# Patient Record
Sex: MALE | Race: BLACK OR AFRICAN AMERICAN | Employment: UNEMPLOYED | URBAN - METROPOLITAN AREA
[De-identification: names, ages, dates, MRNs, and addresses within clinical notes are randomized per-mention and may not be internally consistent; named-entity substitution may affect disease eponyms.]

---

## 2020-07-07 ENCOUNTER — HOSPITAL ENCOUNTER (OUTPATIENT)
Dept: GENERAL RADIOLOGY | Age: 55
Discharge: HOME | End: 2020-07-07
Attending: INTERNAL MEDICINE
Payer: COMMERCIAL

## 2020-07-07 ENCOUNTER — HOSPITAL ENCOUNTER (OUTPATIENT)
Dept: LAB | Age: 55
Discharge: HOME | End: 2020-07-07
Payer: COMMERCIAL

## 2020-07-07 ENCOUNTER — OFFICE VISIT (OUTPATIENT)
Dept: FAMILY MEDICINE CLINIC | Age: 55
End: 2020-07-07

## 2020-07-07 VITALS
RESPIRATION RATE: 16 BRPM | HEART RATE: 62 BPM | HEIGHT: 68 IN | DIASTOLIC BLOOD PRESSURE: 87 MMHG | WEIGHT: 139 LBS | BODY MASS INDEX: 21.07 KG/M2 | SYSTOLIC BLOOD PRESSURE: 153 MMHG | OXYGEN SATURATION: 99 % | TEMPERATURE: 97.8 F

## 2020-07-07 DIAGNOSIS — Z00.00 BLOOD TESTS FOR ROUTINE GENERAL PHYSICAL EXAMINATION: ICD-10-CM

## 2020-07-07 DIAGNOSIS — G89.29 HIP PAIN, CHRONIC, LEFT: Primary | ICD-10-CM

## 2020-07-07 DIAGNOSIS — Z11.4 SCREENING FOR HIV (HUMAN IMMUNODEFICIENCY VIRUS): ICD-10-CM

## 2020-07-07 DIAGNOSIS — G89.29 HIP PAIN, CHRONIC, LEFT: ICD-10-CM

## 2020-07-07 DIAGNOSIS — Z11.59 NEED FOR HEPATITIS C SCREENING TEST: ICD-10-CM

## 2020-07-07 DIAGNOSIS — M25.552 HIP PAIN, CHRONIC, LEFT: ICD-10-CM

## 2020-07-07 DIAGNOSIS — M25.552 HIP PAIN, CHRONIC, LEFT: Primary | ICD-10-CM

## 2020-07-07 DIAGNOSIS — M16.9 OSTEOARTHROSIS, HIP: Primary | ICD-10-CM

## 2020-07-07 DIAGNOSIS — M16.12 OSTEOARTHRITIS OF LEFT HIP, UNSPECIFIED OSTEOARTHRITIS TYPE: ICD-10-CM

## 2020-07-07 LAB
ALBUMIN SERPL-MCNC: 4 G/DL (ref 3.4–5)
ALBUMIN/GLOB SERPL: 1.2 {RATIO} (ref 0.8–1.7)
ALP SERPL-CCNC: 75 U/L (ref 45–117)
ALT SERPL-CCNC: 25 U/L (ref 16–61)
ANION GAP SERPL CALC-SCNC: 4 MMOL/L (ref 3–18)
APPEARANCE UR: CLEAR
AST SERPL-CCNC: 21 U/L (ref 10–38)
BACTERIA URNS QL MICRO: ABNORMAL /HPF
BASOPHILS # BLD: 0 K/UL (ref 0–0.1)
BASOPHILS NFR BLD: 1 % (ref 0–2)
BILIRUB SERPL-MCNC: 0.6 MG/DL (ref 0.2–1)
BILIRUB UR QL: NEGATIVE
BUN SERPL-MCNC: 14 MG/DL (ref 7–18)
BUN/CREAT SERPL: 9 (ref 12–20)
CALCIUM SERPL-MCNC: 9.3 MG/DL (ref 8.5–10.1)
CHLORIDE SERPL-SCNC: 109 MMOL/L (ref 100–111)
CHOLEST SERPL-MCNC: 184 MG/DL
CO2 SERPL-SCNC: 27 MMOL/L (ref 21–32)
COLOR UR: YELLOW
CREAT SERPL-MCNC: 1.54 MG/DL (ref 0.6–1.3)
DIFFERENTIAL METHOD BLD: ABNORMAL
EOSINOPHIL # BLD: 0.4 K/UL (ref 0–0.4)
EOSINOPHIL NFR BLD: 7 % (ref 0–5)
EPITH CASTS URNS QL MICRO: ABNORMAL /LPF (ref 0–5)
ERYTHROCYTE [DISTWIDTH] IN BLOOD BY AUTOMATED COUNT: 13.2 % (ref 11.6–14.5)
EST. AVERAGE GLUCOSE BLD GHB EST-MCNC: 100 MG/DL
GLOBULIN SER CALC-MCNC: 3.4 G/DL (ref 2–4)
GLUCOSE SERPL-MCNC: 90 MG/DL (ref 74–99)
GLUCOSE UR STRIP.AUTO-MCNC: NEGATIVE MG/DL
HBA1C MFR BLD: 5.1 % (ref 4.2–5.6)
HCT VFR BLD AUTO: 41 % (ref 36–48)
HDLC SERPL-MCNC: 111 MG/DL (ref 40–60)
HDLC SERPL: 1.7 {RATIO} (ref 0–5)
HGB BLD-MCNC: 14.3 G/DL (ref 13–16)
HGB UR QL STRIP: NEGATIVE
KETONES UR QL STRIP.AUTO: NEGATIVE MG/DL
LDLC SERPL CALC-MCNC: 51.4 MG/DL (ref 0–100)
LEUKOCYTE ESTERASE UR QL STRIP.AUTO: ABNORMAL
LIPID PROFILE,FLP: ABNORMAL
LYMPHOCYTES # BLD: 1.8 K/UL (ref 0.9–3.6)
LYMPHOCYTES NFR BLD: 28 % (ref 21–52)
MCH RBC QN AUTO: 31.8 PG (ref 24–34)
MCHC RBC AUTO-ENTMCNC: 34.9 G/DL (ref 31–37)
MCV RBC AUTO: 91.3 FL (ref 74–97)
MONOCYTES # BLD: 0.7 K/UL (ref 0.05–1.2)
MONOCYTES NFR BLD: 10 % (ref 3–10)
NEUTS SEG # BLD: 3.5 K/UL (ref 1.8–8)
NEUTS SEG NFR BLD: 54 % (ref 40–73)
NITRITE UR QL STRIP.AUTO: NEGATIVE
PH UR STRIP: 5 [PH] (ref 5–8)
PLATELET # BLD AUTO: 255 K/UL (ref 135–420)
PMV BLD AUTO: 10 FL (ref 9.2–11.8)
POTASSIUM SERPL-SCNC: 4.9 MMOL/L (ref 3.5–5.5)
PROT SERPL-MCNC: 7.4 G/DL (ref 6.4–8.2)
PROT UR STRIP-MCNC: NEGATIVE MG/DL
RBC # BLD AUTO: 4.49 M/UL (ref 4.7–5.5)
SODIUM SERPL-SCNC: 140 MMOL/L (ref 136–145)
SP GR UR REFRACTOMETRY: 1.02 (ref 1–1.03)
TRIGL SERPL-MCNC: 108 MG/DL (ref ?–150)
UROBILINOGEN UR QL STRIP.AUTO: 0.2 EU/DL (ref 0.2–1)
VLDLC SERPL CALC-MCNC: 21.6 MG/DL
WBC # BLD AUTO: 6.4 K/UL (ref 4.6–13.2)
WBC URNS QL MICRO: ABNORMAL /HPF (ref 0–5)

## 2020-07-07 PROCEDURE — 81001 URINALYSIS AUTO W/SCOPE: CPT

## 2020-07-07 PROCEDURE — 80061 LIPID PANEL: CPT

## 2020-07-07 PROCEDURE — 36415 COLL VENOUS BLD VENIPUNCTURE: CPT

## 2020-07-07 PROCEDURE — 80053 COMPREHEN METABOLIC PANEL: CPT

## 2020-07-07 PROCEDURE — 73502 X-RAY EXAM HIP UNI 2-3 VIEWS: CPT

## 2020-07-07 PROCEDURE — 83036 HEMOGLOBIN GLYCOSYLATED A1C: CPT

## 2020-07-07 PROCEDURE — 86803 HEPATITIS C AB TEST: CPT

## 2020-07-07 PROCEDURE — 85025 COMPLETE CBC W/AUTO DIFF WBC: CPT

## 2020-07-07 PROCEDURE — 87389 HIV-1 AG W/HIV-1&-2 AB AG IA: CPT
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
## 2020-07-07 NOTE — PROGRESS NOTES
XR of the hip showed severe osteoarthritis. I have discussed with patient the possibility of ortho referral during his appointment. Referral entered today. Please advise patient.

## 2020-07-07 NOTE — PROGRESS NOTES
Katya Klein presents today for Est Care, left hip pain --   - Is someone accompanying this pt?  Mother, Ghassan Sarai  - Is the patient using any durable medical equipment during office visit? no

## 2020-07-08 LAB
HIV 1+2 AB+HIV1 P24 AG SERPL QL IA: NONREACTIVE
HIV12 RESULT COMMENT, HHIVC: NORMAL

## 2020-07-09 LAB
HCV AB S/CO SERPL IA: 0.1 S/CO RATIO (ref 0–0.9)
HCV AB SERPL QL IA: NORMAL

## 2020-07-20 ENCOUNTER — HOSPITAL ENCOUNTER (OUTPATIENT)
Dept: LAB | Age: 55
Discharge: HOME | End: 2020-07-20
Payer: COMMERCIAL

## 2020-07-20 ENCOUNTER — OFFICE VISIT (OUTPATIENT)
Dept: FAMILY MEDICINE CLINIC | Age: 55
End: 2020-07-20

## 2020-07-20 VITALS
TEMPERATURE: 98.3 F | RESPIRATION RATE: 18 BRPM | SYSTOLIC BLOOD PRESSURE: 139 MMHG | HEIGHT: 68 IN | DIASTOLIC BLOOD PRESSURE: 72 MMHG | OXYGEN SATURATION: 98 % | HEART RATE: 98 BPM | WEIGHT: 145 LBS | BODY MASS INDEX: 21.98 KG/M2

## 2020-07-20 DIAGNOSIS — Z12.11 SCREENING FOR MALIGNANT NEOPLASM OF COLON: ICD-10-CM

## 2020-07-20 DIAGNOSIS — K21.9 GASTROESOPHAGEAL REFLUX DISEASE, ESOPHAGITIS PRESENCE NOT SPECIFIED: ICD-10-CM

## 2020-07-20 DIAGNOSIS — Z00.00 ROUTINE GENERAL MEDICAL EXAMINATION AT A HEALTH CARE FACILITY: Primary | ICD-10-CM

## 2020-07-20 DIAGNOSIS — R94.4 DECREASED GFR: ICD-10-CM

## 2020-07-20 DIAGNOSIS — Z23 ENCOUNTER FOR IMMUNIZATION: ICD-10-CM

## 2020-07-20 DIAGNOSIS — M16.52 POST-TRAUMATIC OSTEOARTHRITIS OF LEFT HIP: ICD-10-CM

## 2020-07-20 LAB
ANION GAP SERPL CALC-SCNC: 3 MMOL/L (ref 3–18)
BUN SERPL-MCNC: 16 MG/DL (ref 7–18)
BUN/CREAT SERPL: 11 (ref 12–20)
CALCIUM SERPL-MCNC: 8.8 MG/DL (ref 8.5–10.1)
CHLORIDE SERPL-SCNC: 106 MMOL/L (ref 100–111)
CO2 SERPL-SCNC: 30 MMOL/L (ref 21–32)
CREAT SERPL-MCNC: 1.51 MG/DL (ref 0.6–1.3)
GLUCOSE SERPL-MCNC: 86 MG/DL (ref 74–99)
POTASSIUM SERPL-SCNC: 5 MMOL/L (ref 3.5–5.5)
SODIUM SERPL-SCNC: 139 MMOL/L (ref 136–145)

## 2020-07-20 PROCEDURE — 80048 BASIC METABOLIC PNL TOTAL CA: CPT

## 2020-07-20 RX ORDER — OMEPRAZOLE 40 MG/1
40 CAPSULE, DELAYED RELEASE ORAL DAILY
Qty: 30 CAP | Refills: 0 | Status: SHIPPED | OUTPATIENT
Start: 2020-07-20 | End: 2020-09-28 | Stop reason: SDUPTHER

## 2020-07-20 NOTE — PROGRESS NOTES
Katya Klein presents today for results, cpe  - Is someone accompanying this pt? mother  - Is the patient using any durable medical equipment during office visit? no    Coordination of Care:  1. Have you been to the ER, urgent care clinic since your last visit? Hospitalized since your last visit? no    2. Have you seen or consulted any other health care providers outside of the [address] [place] [address] since your last visit? Include any pap smears or colon screening. No      Obtained signed consent form from patient. Per verbal order from Dr. Tabby Claudio- injection of shingrix #1 and Pneumovax 23 administered. Verified by this nurse and ANUP Hood that this is the correct immunization/injection. Patient instructed to remain in clinic for 15 minutes and to report any adverse reactions immediately. Most recent VIS given. No adverse reactions noted after 15 minutes of observation. Depression Screening:  3 most recent PHQ Screens 7/7/2020   Little interest or pleasure in doing things Several days   Feeling down, depressed, irritable, or hopeless Not at all   Total Score PHQ 2 1       Learning Assessment:  Learning Assessment 7/7/2020   PRIMARY LEARNER Patient   HIGHEST LEVEL OF EDUCATION - PRIMARY LEARNER  SOME COLLEGE   BARRIERS PRIMARY LEARNER NONE   CO-LEARNER CAREGIVER No   PRIMARY LANGUAGE ENGLISH   LEARNER PREFERENCE PRIMARY LISTENING   ANSWERED BY patient   RELATIONSHIP SELF       Abuse Screening:  Abuse Screening Questionnaire 7/7/2020   Do you ever feel afraid of your partner? N   Are you in a relationship with someone who physically or mentally threatens you? N   Is it safe for you to go home? Y       Fall Risk  Fall Risk Assessment, last 12 mths 7/7/2020   Able to walk? Yes   Fall in past 12 months? No       Health Maintenance reviewed and discussed and ordered per Provider.   Health Maintenance Due   Topic Date Due    Pneumococcal 0-64 years (1 of 1 - PPSV23) 01/06/1971    DTaP/Tdap/Td series (1 - Tdap) 01/06/1986    Shingrix Vaccine Age 50> (1 of 2) 01/06/2015    FOBT Q1Y Age 50-75  01/06/2015
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
## 2020-08-13 ENCOUNTER — OFFICE VISIT (OUTPATIENT)
Dept: ORTHOPEDIC SURGERY | Facility: CLINIC | Age: 55
End: 2020-08-13

## 2020-08-13 VITALS
HEIGHT: 68 IN | DIASTOLIC BLOOD PRESSURE: 83 MMHG | TEMPERATURE: 97.5 F | BODY MASS INDEX: 22.13 KG/M2 | OXYGEN SATURATION: 98 % | WEIGHT: 146 LBS | HEART RATE: 80 BPM | SYSTOLIC BLOOD PRESSURE: 146 MMHG | RESPIRATION RATE: 18 BRPM

## 2020-08-13 DIAGNOSIS — M87.052 AVASCULAR NECROSIS OF BONE OF LEFT HIP (HCC): ICD-10-CM

## 2020-08-13 DIAGNOSIS — M16.52 POST-TRAUMATIC OSTEOARTHRITIS OF LEFT HIP: Primary | ICD-10-CM

## 2020-08-13 DIAGNOSIS — G89.29 CHRONIC LEFT HIP PAIN: ICD-10-CM

## 2020-08-13 DIAGNOSIS — M25.552 CHRONIC LEFT HIP PAIN: ICD-10-CM

## 2020-08-13 RX ORDER — NAPROXEN SODIUM 220 MG
220 TABLET ORAL 2 TIMES DAILY WITH MEALS
COMMUNITY
End: 2020-09-28

## 2020-08-13 RX ORDER — IBUPROFEN 200 MG
TABLET ORAL
COMMUNITY
End: 2020-10-05

## 2020-08-13 RX ORDER — ACETAMINOPHEN 325 MG/1
TABLET ORAL
COMMUNITY
End: 2020-09-28 | Stop reason: DRUGHIGH

## 2020-08-24 DIAGNOSIS — M16.52 POST-TRAUMATIC OSTEOARTHRITIS OF LEFT HIP: ICD-10-CM

## 2020-08-24 DIAGNOSIS — Z01.811 PRE-OPERATIVE RESPIRATORY EXAMINATION: ICD-10-CM

## 2020-08-24 DIAGNOSIS — M87.052 AVASCULAR NECROSIS OF BONE OF LEFT HIP (HCC): Primary | ICD-10-CM

## 2020-08-24 DIAGNOSIS — Z01.810 PRE-OPERATIVE CARDIOVASCULAR EXAMINATION: ICD-10-CM

## 2020-08-24 DIAGNOSIS — Z01.812 PRE-OPERATIVE LABORATORY EXAMINATION: ICD-10-CM

## 2020-08-25 DIAGNOSIS — Z01.812 PRE-OPERATIVE LABORATORY EXAMINATION: ICD-10-CM

## 2020-08-25 DIAGNOSIS — M87.052 AVASCULAR NECROSIS OF BONE OF LEFT HIP (HCC): Primary | ICD-10-CM

## 2020-08-25 DIAGNOSIS — M16.52 POST-TRAUMATIC OSTEOARTHRITIS OF LEFT HIP: ICD-10-CM

## 2020-09-24 ENCOUNTER — HOSPITAL ENCOUNTER (OUTPATIENT)
Dept: GENERAL RADIOLOGY | Age: 55
Discharge: HOME | End: 2020-09-24
Payer: COMMERCIAL

## 2020-09-24 ENCOUNTER — HOSPITAL ENCOUNTER (OUTPATIENT)
Dept: PREADMISSION TESTING | Age: 55
Discharge: HOME | End: 2020-09-24
Payer: COMMERCIAL

## 2020-09-24 DIAGNOSIS — Z01.811 PRE-OPERATIVE RESPIRATORY EXAMINATION: ICD-10-CM

## 2020-09-24 DIAGNOSIS — Z01.812 PRE-OPERATIVE LABORATORY EXAMINATION: ICD-10-CM

## 2020-09-24 DIAGNOSIS — M87.052 AVASCULAR NECROSIS OF BONE OF LEFT HIP (HCC): ICD-10-CM

## 2020-09-24 DIAGNOSIS — M16.52 POST-TRAUMATIC OSTEOARTHRITIS OF LEFT HIP: ICD-10-CM

## 2020-09-24 DIAGNOSIS — Z01.810 PRE-OPERATIVE CARDIOVASCULAR EXAMINATION: ICD-10-CM

## 2020-09-24 LAB
ABO + RH BLD: NORMAL
ANION GAP SERPL CALC-SCNC: 3 MMOL/L (ref 3–18)
APPEARANCE UR: CLEAR
APTT PPP: 33.6 SEC (ref 23–36.4)
ATRIAL RATE: 64 BPM
BASOPHILS # BLD: 0 K/UL (ref 0–0.1)
BASOPHILS NFR BLD: 0 % (ref 0–2)
BILIRUB UR QL: NEGATIVE
BLOOD BANK CMNT PATIENT-IMP: NORMAL
BLOOD GROUP ANTIBODIES SERPL: NORMAL
BUN SERPL-MCNC: 16 MG/DL (ref 7–18)
BUN/CREAT SERPL: 11 (ref 12–20)
CALCIUM SERPL-MCNC: 9.1 MG/DL (ref 8.5–10.1)
CALCULATED P AXIS, ECG09: 22 DEGREES
CALCULATED R AXIS, ECG10: 61 DEGREES
CALCULATED T AXIS, ECG11: 39 DEGREES
CHLORIDE SERPL-SCNC: 109 MMOL/L (ref 100–111)
CO2 SERPL-SCNC: 31 MMOL/L (ref 21–32)
COLOR UR: YELLOW
CREAT SERPL-MCNC: 1.51 MG/DL (ref 0.6–1.3)
DIAGNOSIS, 93000: NORMAL
DIFFERENTIAL METHOD BLD: ABNORMAL
EOSINOPHIL # BLD: 0.4 K/UL (ref 0–0.4)
EOSINOPHIL NFR BLD: 6 % (ref 0–5)
ERYTHROCYTE [DISTWIDTH] IN BLOOD BY AUTOMATED COUNT: 12.8 % (ref 11.6–14.5)
GLUCOSE SERPL-MCNC: 78 MG/DL (ref 74–99)
GLUCOSE UR STRIP.AUTO-MCNC: NEGATIVE MG/DL
HBA1C MFR BLD: 5 % (ref 4.2–5.6)
HCT VFR BLD AUTO: 38.9 % (ref 36–48)
HGB BLD-MCNC: 13.6 G/DL (ref 13–16)
HGB UR QL STRIP: NEGATIVE
INR PPP: 0.9 (ref 0.8–1.2)
KETONES UR QL STRIP.AUTO: NEGATIVE MG/DL
LEUKOCYTE ESTERASE UR QL STRIP.AUTO: NEGATIVE
LYMPHOCYTES # BLD: 1.8 K/UL (ref 0.9–3.6)
LYMPHOCYTES NFR BLD: 26 % (ref 21–52)
MCH RBC QN AUTO: 32.5 PG (ref 24–34)
MCHC RBC AUTO-ENTMCNC: 35 G/DL (ref 31–37)
MCV RBC AUTO: 93.1 FL (ref 74–97)
MONOCYTES # BLD: 0.6 K/UL (ref 0.05–1.2)
MONOCYTES NFR BLD: 9 % (ref 3–10)
NEUTS SEG # BLD: 4.1 K/UL (ref 1.8–8)
NEUTS SEG NFR BLD: 59 % (ref 40–73)
NITRITE UR QL STRIP.AUTO: NEGATIVE
P-R INTERVAL, ECG05: 122 MS
PH UR STRIP: 6.5 [PH] (ref 5–8)
PLATELET # BLD AUTO: 236 K/UL (ref 135–420)
PMV BLD AUTO: 10.3 FL (ref 9.2–11.8)
POTASSIUM SERPL-SCNC: 4.2 MMOL/L (ref 3.5–5.5)
PROT UR STRIP-MCNC: NEGATIVE MG/DL
PROTHROMBIN TIME: 12.4 SEC (ref 11.5–15.2)
Q-T INTERVAL, ECG07: 396 MS
QRS DURATION, ECG06: 68 MS
QTC CALCULATION (BEZET), ECG08: 408 MS
RBC # BLD AUTO: 4.18 M/UL (ref 4.7–5.5)
SODIUM SERPL-SCNC: 143 MMOL/L (ref 136–145)
SP GR UR REFRACTOMETRY: 1.01 (ref 1–1.03)
SPECIMEN EXP DATE BLD: NORMAL
UROBILINOGEN UR QL STRIP.AUTO: 0.2 EU/DL (ref 0.2–1)
VENTRICULAR RATE, ECG03: 64 BPM
WBC # BLD AUTO: 6.9 K/UL (ref 4.6–13.2)

## 2020-09-24 PROCEDURE — 93005 ELECTROCARDIOGRAM TRACING: CPT

## 2020-09-24 PROCEDURE — 83036 HEMOGLOBIN GLYCOSYLATED A1C: CPT

## 2020-09-24 PROCEDURE — 85610 PROTHROMBIN TIME: CPT

## 2020-09-24 PROCEDURE — 80048 BASIC METABOLIC PNL TOTAL CA: CPT

## 2020-09-24 PROCEDURE — 71046 X-RAY EXAM CHEST 2 VIEWS: CPT

## 2020-09-24 PROCEDURE — 86900 BLOOD TYPING SEROLOGIC ABO: CPT

## 2020-09-24 PROCEDURE — 85730 THROMBOPLASTIN TIME PARTIAL: CPT

## 2020-09-24 PROCEDURE — 81003 URINALYSIS AUTO W/O SCOPE: CPT

## 2020-09-24 PROCEDURE — 36415 COLL VENOUS BLD VENIPUNCTURE: CPT

## 2020-09-24 PROCEDURE — 87086 URINE CULTURE/COLONY COUNT: CPT

## 2020-09-24 PROCEDURE — 85025 COMPLETE CBC W/AUTO DIFF WBC: CPT

## 2020-09-25 LAB
BACTERIA SPEC CULT: NORMAL
SERVICE CMNT-IMP: NORMAL

## 2020-09-28 ENCOUNTER — OFFICE VISIT (OUTPATIENT)
Dept: FAMILY MEDICINE CLINIC | Age: 55
End: 2020-09-28
Payer: COMMERCIAL

## 2020-09-28 VITALS
HEIGHT: 68 IN | DIASTOLIC BLOOD PRESSURE: 82 MMHG | TEMPERATURE: 97.7 F | HEART RATE: 87 BPM | OXYGEN SATURATION: 98 % | RESPIRATION RATE: 15 BRPM | WEIGHT: 141 LBS | SYSTOLIC BLOOD PRESSURE: 148 MMHG | BODY MASS INDEX: 21.37 KG/M2

## 2020-09-28 DIAGNOSIS — Z01.818 PRE-OP EXAMINATION: Primary | ICD-10-CM

## 2020-09-28 DIAGNOSIS — K21.9 GASTROESOPHAGEAL REFLUX DISEASE, ESOPHAGITIS PRESENCE NOT SPECIFIED: ICD-10-CM

## 2020-09-28 DIAGNOSIS — Z23 ENCOUNTER FOR IMMUNIZATION: ICD-10-CM

## 2020-09-28 DIAGNOSIS — M16.52 POST-TRAUMATIC OSTEOARTHRITIS OF LEFT HIP: ICD-10-CM

## 2020-09-28 PROBLEM — N28.9 RENAL INSUFFICIENCY: Status: ACTIVE | Noted: 2020-07-20

## 2020-09-28 PROCEDURE — 90715 TDAP VACCINE 7 YRS/> IM: CPT | Performed by: INTERNAL MEDICINE

## 2020-09-28 PROCEDURE — 99214 OFFICE O/P EST MOD 30 MIN: CPT | Performed by: INTERNAL MEDICINE

## 2020-09-28 PROCEDURE — 90471 IMMUNIZATION ADMIN: CPT | Performed by: INTERNAL MEDICINE

## 2020-09-28 RX ORDER — OMEPRAZOLE 40 MG/1
40 CAPSULE, DELAYED RELEASE ORAL DAILY
Qty: 30 CAP | Refills: 0 | Status: SHIPPED | OUTPATIENT
Start: 2020-09-28

## 2020-09-28 RX ORDER — ACETAMINOPHEN 500 MG
1000 TABLET ORAL
Qty: 60 TAB | Refills: 0 | Status: SHIPPED | OUTPATIENT
Start: 2020-09-28
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
## 2020-09-28 NOTE — PROGRESS NOTES
Fani Astudillo presents today for pre-op clearance left hip   - Is someone accompanying this pt? no  - Is the patient using any durable medical equipment during office visit? no    Coordination of Care:  1. Have you been to the ER, urgent care clinic since your last visit? Hospitalized since your last visit? no    2. Have you seen or consulted any other health care providers outside of the [address] [place] [address] since your last visit? Include any pap smears or colon screening. Dr. Jenni Velasquez, ortho    Obtained signed consent form from patient. Per verbal order from Dr. Espinosa Rater- injection of TDAP administered. Verified by this nurse and ANUP Hood that this is the correct immunization/injection. Patient instructed to remain in clinic for 15 minutes and to report any adverse reactions immediately. Most recent VIS given. No adverse reactions noted after 15 minutes of observation.

## 2020-10-01 ENCOUNTER — HOSPITAL ENCOUNTER (OUTPATIENT)
Dept: PREADMISSION TESTING | Age: 55
Discharge: HOME | End: 2020-10-01
Payer: COMMERCIAL

## 2020-10-01 ENCOUNTER — OFFICE VISIT (OUTPATIENT)
Dept: ORTHOPEDIC SURGERY | Age: 55
End: 2020-10-01

## 2020-10-01 VITALS
HEIGHT: 68 IN | OXYGEN SATURATION: 98 % | BODY MASS INDEX: 21.82 KG/M2 | HEART RATE: 67 BPM | WEIGHT: 144 LBS | RESPIRATION RATE: 16 BRPM | TEMPERATURE: 97 F | SYSTOLIC BLOOD PRESSURE: 152 MMHG | DIASTOLIC BLOOD PRESSURE: 78 MMHG

## 2020-10-01 DIAGNOSIS — M16.52 POST-TRAUMATIC OSTEOARTHRITIS OF LEFT HIP: ICD-10-CM

## 2020-10-01 DIAGNOSIS — M87.052 AVASCULAR NECROSIS OF BONE OF LEFT HIP (HCC): ICD-10-CM

## 2020-10-01 DIAGNOSIS — G89.18 POSTOPERATIVE PAIN: ICD-10-CM

## 2020-10-01 DIAGNOSIS — Z01.812 PRE-OPERATIVE LABORATORY EXAMINATION: ICD-10-CM

## 2020-10-01 DIAGNOSIS — M25.552 CHRONIC LEFT HIP PAIN: Primary | ICD-10-CM

## 2020-10-01 DIAGNOSIS — Z01.818 PREOP GENERAL PHYSICAL EXAM: ICD-10-CM

## 2020-10-01 DIAGNOSIS — Z02.89 ENCOUNTER FOR REVIEW OF FORM WITH PATIENT: ICD-10-CM

## 2020-10-01 DIAGNOSIS — G89.29 CHRONIC LEFT HIP PAIN: Primary | ICD-10-CM

## 2020-10-01 PROCEDURE — 87635 SARS-COV-2 COVID-19 AMP PRB: CPT

## 2020-10-01 RX ORDER — ACETAMINOPHEN 325 MG/1
650 TABLET ORAL
Status: CANCELLED | OUTPATIENT
Start: 2020-10-06 | End: 2020-10-07

## 2020-10-01 RX ORDER — CELECOXIB 100 MG/1
200 CAPSULE ORAL
Status: CANCELLED | OUTPATIENT
Start: 2020-10-06 | End: 2020-10-07

## 2020-10-01 RX ORDER — PREGABALIN 25 MG/1
75 CAPSULE ORAL
Status: CANCELLED | OUTPATIENT
Start: 2020-10-06 | End: 2020-10-07
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
## 2020-10-02 LAB — SARS-COV-2, COV2NT: NOT DETECTED

## 2020-10-05 ENCOUNTER — ANESTHESIA EVENT (OUTPATIENT)
Dept: SURGERY | Age: 55
DRG: 301 | End: 2020-10-05
Payer: COMMERCIAL

## 2020-10-06 ENCOUNTER — ANESTHESIA (OUTPATIENT)
Dept: SURGERY | Age: 55
DRG: 301 | End: 2020-10-06
Payer: COMMERCIAL

## 2020-10-06 ENCOUNTER — HOSPITAL ENCOUNTER (INPATIENT)
Age: 55
LOS: 1 days | Discharge: HOME HEALTH | DRG: 301 | End: 2020-10-07
Attending: SPECIALIST | Admitting: SPECIALIST
Payer: COMMERCIAL

## 2020-10-06 DIAGNOSIS — G89.18 ACUTE POST-OPERATIVE PAIN: Primary | ICD-10-CM

## 2020-10-06 PROBLEM — M16.12 OSTEOARTHRITIS OF LEFT HIP: Status: ACTIVE | Noted: 2020-10-06

## 2020-10-06 LAB
AMPHET UR QL SCN: NEGATIVE
BARBITURATES UR QL SCN: NEGATIVE
BENZODIAZ UR QL: NEGATIVE
CANNABINOIDS UR QL SCN: POSITIVE
COCAINE UR QL SCN: NEGATIVE
HCT VFR BLD AUTO: 33.8 % (ref 36–48)
HDSCOM,HDSCOM: ABNORMAL
HGB BLD-MCNC: 11.9 G/DL (ref 13–16)
METHADONE UR QL: NEGATIVE
OPIATES UR QL: NEGATIVE
PCP UR QL: NEGATIVE

## 2020-10-06 PROCEDURE — 77030014368: Performed by: SPECIALIST

## 2020-10-06 PROCEDURE — 77030008683 HC TU ET CUF COVD -A: Performed by: ANESTHESIOLOGY

## 2020-10-06 PROCEDURE — 74011000258 HC RX REV CODE- 258: Performed by: ANESTHESIOLOGY

## 2020-10-06 PROCEDURE — 74011250636 HC RX REV CODE- 250/636: Performed by: ANESTHESIOLOGY

## 2020-10-06 PROCEDURE — 77030013708 HC HNDPC SUC IRR PULS STRY –B: Performed by: SPECIALIST

## 2020-10-06 PROCEDURE — 64449 NJX AA&/STRD LMBR PLEX NFS: CPT | Performed by: ANESTHESIOLOGY

## 2020-10-06 PROCEDURE — 77030026438 HC STYL ET INTUB CARD -A: Performed by: ANESTHESIOLOGY

## 2020-10-06 PROCEDURE — 97161 PT EVAL LOW COMPLEX 20 MIN: CPT

## 2020-10-06 PROCEDURE — 01214 ANES OPEN PX TOT HIP ARTHRP: CPT | Performed by: ANESTHESIOLOGY

## 2020-10-06 PROCEDURE — 77030031139 HC SUT VCRL2 J&J -A: Performed by: SPECIALIST

## 2020-10-06 PROCEDURE — 77030003666 HC NDL SPINAL BD -A: Performed by: SPECIALIST

## 2020-10-06 PROCEDURE — 85018 HEMOGLOBIN: CPT

## 2020-10-06 PROCEDURE — 74011250637 HC RX REV CODE- 250/637: Performed by: PHYSICIAN ASSISTANT

## 2020-10-06 PROCEDURE — 74011250637 HC RX REV CODE- 250/637: Performed by: NURSE ANESTHETIST, CERTIFIED REGISTERED

## 2020-10-06 PROCEDURE — 77030006835 HC BLD SAW SAG STRY -B: Performed by: SPECIALIST

## 2020-10-06 PROCEDURE — 77030003029 HC SUT VCRL J&J -B: Performed by: SPECIALIST

## 2020-10-06 PROCEDURE — 77030027138 HC INCENT SPIROMETER -A: Performed by: SPECIALIST

## 2020-10-06 PROCEDURE — 77030018673: Performed by: SPECIALIST

## 2020-10-06 PROCEDURE — 74011250636 HC RX REV CODE- 250/636: Performed by: PHYSICIAN ASSISTANT

## 2020-10-06 PROCEDURE — 2709999900 HC NON-CHARGEABLE SUPPLY: Performed by: SPECIALIST

## 2020-10-06 PROCEDURE — 77030013079 HC BLNKT BAIR HGGR 3M -A: Performed by: ANESTHESIOLOGY

## 2020-10-06 PROCEDURE — 74011000250 HC RX REV CODE- 250: Performed by: ANESTHESIOLOGY

## 2020-10-06 PROCEDURE — 74011000250 HC RX REV CODE- 250: Performed by: PHYSICIAN ASSISTANT

## 2020-10-06 PROCEDURE — 80307 DRUG TEST PRSMV CHEM ANLYZR: CPT

## 2020-10-06 PROCEDURE — 77030008462 HC STPLR SKN PROX J&J -A: Performed by: SPECIALIST

## 2020-10-06 PROCEDURE — 36415 COLL VENOUS BLD VENIPUNCTURE: CPT

## 2020-10-06 PROCEDURE — 74011000250 HC RX REV CODE- 250

## 2020-10-06 PROCEDURE — 76060000036 HC ANESTHESIA 2.5 TO 3 HR: Performed by: SPECIALIST

## 2020-10-06 PROCEDURE — 27130 TOTAL HIP ARTHROPLASTY: CPT | Performed by: SPECIALIST

## 2020-10-06 PROCEDURE — C1776 JOINT DEVICE (IMPLANTABLE): HCPCS | Performed by: SPECIALIST

## 2020-10-06 PROCEDURE — 64450 NJX AA&/STRD OTHER PN/BRANCH: CPT | Performed by: ANESTHESIOLOGY

## 2020-10-06 PROCEDURE — 77030040830 HC CATH URETH FOL MDII -A: Performed by: SPECIALIST

## 2020-10-06 PROCEDURE — 65270000029 HC RM PRIVATE

## 2020-10-06 PROCEDURE — 77030040922 HC BLNKT HYPOTHRM STRY -A: Performed by: SPECIALIST

## 2020-10-06 PROCEDURE — 74011250636 HC RX REV CODE- 250/636: Performed by: SPECIALIST

## 2020-10-06 PROCEDURE — 77030019605: Performed by: SPECIALIST

## 2020-10-06 PROCEDURE — 74011250636 HC RX REV CODE- 250/636: Performed by: NURSE ANESTHETIST, CERTIFIED REGISTERED

## 2020-10-06 PROCEDURE — 77030040361 HC SLV COMPR DVT MDII -B: Performed by: SPECIALIST

## 2020-10-06 PROCEDURE — 3E0T3BZ INTRODUCTION OF ANESTHETIC AGENT INTO PERIPHERAL NERVES AND PLEXI, PERCUTANEOUS APPROACH: ICD-10-PCS | Performed by: SPECIALIST

## 2020-10-06 PROCEDURE — 74011250636 HC RX REV CODE- 250/636

## 2020-10-06 PROCEDURE — 76010000172 HC OR TIME 2.5 TO 3 HR INTENSV-TIER 1: Performed by: SPECIALIST

## 2020-10-06 PROCEDURE — 0SRB04Z REPLACEMENT OF LEFT HIP JOINT WITH CERAMIC ON POLYETHYLENE SYNTHETIC SUBSTITUTE, OPEN APPROACH: ICD-10-PCS | Performed by: SPECIALIST

## 2020-10-06 PROCEDURE — 74011250637 HC RX REV CODE- 250/637: Performed by: SPECIALIST

## 2020-10-06 PROCEDURE — 76210000016 HC OR PH I REC 1 TO 1.5 HR: Performed by: SPECIALIST

## 2020-10-06 DEVICE — SHELL ACET OD54MM ID24MM PPS LIMIT H FIN RINGLOC +: Type: IMPLANTABLE DEVICE | Site: HIP | Status: FUNCTIONAL

## 2020-10-06 DEVICE — STEM FEM SZ 16 L152MM 133DEG DSTL HIP PPS STD OFFSET TYP 1: Type: IMPLANTABLE DEVICE | Site: HIP | Status: FUNCTIONAL

## 2020-10-06 DEVICE — LINER ACET SZ 24MM OD40MM +3MM UHMWPE MAX ROM RINGLOC +: Type: IMPLANTABLE DEVICE | Site: HIP | Status: FUNCTIONAL

## 2020-10-06 DEVICE — HEAD FEM DIA40MM HIP BIOLOX DELT OPT FOR G7 ACET SYS: Type: IMPLANTABLE DEVICE | Site: HIP | Status: FUNCTIONAL

## 2020-10-06 DEVICE — PLUG ACET DIA3/8-24MM UHMWPE APCL H MOD REGENEREX RINGLOC: Type: IMPLANTABLE DEVICE | Site: HIP | Status: FUNCTIONAL

## 2020-10-06 DEVICE — SLEEVE TAPE ADPT OP BILOX NEG6 --: Type: IMPLANTABLE DEVICE | Site: HIP | Status: FUNCTIONAL

## 2020-10-06 DEVICE — Z DUP USE 2503709 SCREW BNE L15MM DIA6.5MM ACET HIP TI ST TRIL: Type: IMPLANTABLE DEVICE | Site: HIP | Status: FUNCTIONAL

## 2020-10-06 RX ORDER — FAMOTIDINE 20 MG/1
20 TABLET, FILM COATED ORAL ONCE
Status: COMPLETED | OUTPATIENT
Start: 2020-10-06 | End: 2020-10-06

## 2020-10-06 RX ORDER — HYDROMORPHONE HYDROCHLORIDE 2 MG/ML
INJECTION, SOLUTION INTRAMUSCULAR; INTRAVENOUS; SUBCUTANEOUS
Status: DISPENSED
Start: 2020-10-06 | End: 2020-10-06

## 2020-10-06 RX ORDER — OXYCODONE HYDROCHLORIDE 5 MG/1
10 TABLET ORAL
Status: DISCONTINUED | OUTPATIENT
Start: 2020-10-06 | End: 2020-10-07 | Stop reason: HOSPADM

## 2020-10-06 RX ORDER — SODIUM CHLORIDE 0.9 % (FLUSH) 0.9 %
5-40 SYRINGE (ML) INJECTION EVERY 8 HOURS
Status: DISCONTINUED | OUTPATIENT
Start: 2020-10-06 | End: 2020-10-06 | Stop reason: HOSPADM

## 2020-10-06 RX ORDER — MIDAZOLAM HYDROCHLORIDE 1 MG/ML
INJECTION, SOLUTION INTRAMUSCULAR; INTRAVENOUS
Status: COMPLETED | OUTPATIENT
Start: 2020-10-06 | End: 2020-10-06

## 2020-10-06 RX ORDER — HYDROMORPHONE HYDROCHLORIDE 2 MG/ML
0.5 INJECTION, SOLUTION INTRAMUSCULAR; INTRAVENOUS; SUBCUTANEOUS
Status: DISCONTINUED | OUTPATIENT
Start: 2020-10-06 | End: 2020-10-06 | Stop reason: HOSPADM

## 2020-10-06 RX ORDER — CELECOXIB 100 MG/1
200 CAPSULE ORAL
Status: COMPLETED | OUTPATIENT
Start: 2020-10-06 | End: 2020-10-06

## 2020-10-06 RX ORDER — LIDOCAINE HYDROCHLORIDE 10 MG/ML
0.1 INJECTION, SOLUTION EPIDURAL; INFILTRATION; INTRACAUDAL; PERINEURAL AS NEEDED
Status: DISCONTINUED | OUTPATIENT
Start: 2020-10-06 | End: 2020-10-06 | Stop reason: HOSPADM

## 2020-10-06 RX ORDER — DEXTROSE 50 % IN WATER (D50W) INTRAVENOUS SYRINGE
25-50 AS NEEDED
Status: DISCONTINUED | OUTPATIENT
Start: 2020-10-06 | End: 2020-10-06 | Stop reason: HOSPADM

## 2020-10-06 RX ORDER — OXYCODONE HYDROCHLORIDE 5 MG/1
5 TABLET ORAL
Status: DISCONTINUED | OUTPATIENT
Start: 2020-10-06 | End: 2020-10-07 | Stop reason: HOSPADM

## 2020-10-06 RX ORDER — PHENYLEPHRINE HCL IN 0.9% NACL 1 MG/10 ML
SYRINGE (ML) INTRAVENOUS AS NEEDED
Status: DISCONTINUED | OUTPATIENT
Start: 2020-10-06 | End: 2020-10-06 | Stop reason: HOSPADM

## 2020-10-06 RX ORDER — MAGNESIUM SULFATE 100 %
4 CRYSTALS MISCELLANEOUS AS NEEDED
Status: DISCONTINUED | OUTPATIENT
Start: 2020-10-06 | End: 2020-10-06 | Stop reason: HOSPADM

## 2020-10-06 RX ORDER — NALOXONE HYDROCHLORIDE 0.4 MG/ML
0.4 INJECTION, SOLUTION INTRAMUSCULAR; INTRAVENOUS; SUBCUTANEOUS AS NEEDED
Status: DISCONTINUED | OUTPATIENT
Start: 2020-10-06 | End: 2020-10-07 | Stop reason: HOSPADM

## 2020-10-06 RX ORDER — VECURONIUM BROMIDE FOR INJECTION 1 MG/ML
INJECTION, POWDER, LYOPHILIZED, FOR SOLUTION INTRAVENOUS AS NEEDED
Status: DISCONTINUED | OUTPATIENT
Start: 2020-10-06 | End: 2020-10-06 | Stop reason: HOSPADM

## 2020-10-06 RX ORDER — ROPIVACAINE HYDROCHLORIDE 2 MG/ML
INJECTION, SOLUTION EPIDURAL; INFILTRATION; PERINEURAL
Status: COMPLETED | OUTPATIENT
Start: 2020-10-06 | End: 2020-10-06

## 2020-10-06 RX ORDER — SODIUM CHLORIDE, SODIUM LACTATE, POTASSIUM CHLORIDE, CALCIUM CHLORIDE 600; 310; 30; 20 MG/100ML; MG/100ML; MG/100ML; MG/100ML
75 INJECTION, SOLUTION INTRAVENOUS CONTINUOUS
Status: DISCONTINUED | OUTPATIENT
Start: 2020-10-06 | End: 2020-10-06 | Stop reason: HOSPADM

## 2020-10-06 RX ORDER — HYDROMORPHONE HYDROCHLORIDE 1 MG/ML
1 INJECTION, SOLUTION INTRAMUSCULAR; INTRAVENOUS; SUBCUTANEOUS
Status: DISCONTINUED | OUTPATIENT
Start: 2020-10-06 | End: 2020-10-06 | Stop reason: SDUPTHER

## 2020-10-06 RX ORDER — MIDAZOLAM HYDROCHLORIDE 1 MG/ML
INJECTION, SOLUTION INTRAMUSCULAR; INTRAVENOUS AS NEEDED
Status: DISCONTINUED | OUTPATIENT
Start: 2020-10-06 | End: 2020-10-06 | Stop reason: HOSPADM

## 2020-10-06 RX ORDER — CEFAZOLIN SODIUM 1 G/3ML
INJECTION, POWDER, FOR SOLUTION INTRAMUSCULAR; INTRAVENOUS AS NEEDED
Status: DISCONTINUED | OUTPATIENT
Start: 2020-10-06 | End: 2020-10-06 | Stop reason: HOSPADM

## 2020-10-06 RX ORDER — SODIUM CHLORIDE, SODIUM LACTATE, POTASSIUM CHLORIDE, CALCIUM CHLORIDE 600; 310; 30; 20 MG/100ML; MG/100ML; MG/100ML; MG/100ML
INJECTION, SOLUTION INTRAVENOUS
Status: DISCONTINUED | OUTPATIENT
Start: 2020-10-06 | End: 2020-10-06 | Stop reason: HOSPADM

## 2020-10-06 RX ORDER — LIDOCAINE HYDROCHLORIDE 20 MG/ML
INJECTION, SOLUTION EPIDURAL; INFILTRATION; INTRACAUDAL; PERINEURAL AS NEEDED
Status: DISCONTINUED | OUTPATIENT
Start: 2020-10-06 | End: 2020-10-06 | Stop reason: HOSPADM

## 2020-10-06 RX ORDER — AMOXICILLIN 250 MG
1 CAPSULE ORAL 2 TIMES DAILY
Status: DISCONTINUED | OUTPATIENT
Start: 2020-10-06 | End: 2020-10-07 | Stop reason: HOSPADM

## 2020-10-06 RX ORDER — PROPOFOL 10 MG/ML
INJECTION, EMULSION INTRAVENOUS AS NEEDED
Status: DISCONTINUED | OUTPATIENT
Start: 2020-10-06 | End: 2020-10-06 | Stop reason: HOSPADM

## 2020-10-06 RX ORDER — SODIUM CHLORIDE 0.9 % (FLUSH) 0.9 %
5-40 SYRINGE (ML) INJECTION AS NEEDED
Status: DISCONTINUED | OUTPATIENT
Start: 2020-10-06 | End: 2020-10-06 | Stop reason: HOSPADM

## 2020-10-06 RX ORDER — CEFAZOLIN SODIUM 2 G/50ML
2 SOLUTION INTRAVENOUS EVERY 8 HOURS
Status: COMPLETED | OUTPATIENT
Start: 2020-10-06 | End: 2020-10-07

## 2020-10-06 RX ORDER — SODIUM CHLORIDE 0.9 % (FLUSH) 0.9 %
5-40 SYRINGE (ML) INJECTION AS NEEDED
Status: DISCONTINUED | OUTPATIENT
Start: 2020-10-06 | End: 2020-10-07 | Stop reason: HOSPADM

## 2020-10-06 RX ORDER — LIDOCAINE HYDROCHLORIDE 10 MG/ML
INJECTION, SOLUTION EPIDURAL; INFILTRATION; INTRACAUDAL; PERINEURAL
Status: COMPLETED
Start: 2020-10-06 | End: 2020-10-06

## 2020-10-06 RX ORDER — FENTANYL CITRATE 50 UG/ML
INJECTION, SOLUTION INTRAMUSCULAR; INTRAVENOUS
Status: COMPLETED | OUTPATIENT
Start: 2020-10-06 | End: 2020-10-06

## 2020-10-06 RX ORDER — PREGABALIN 75 MG/1
75 CAPSULE ORAL
Status: COMPLETED | OUTPATIENT
Start: 2020-10-06 | End: 2020-10-06

## 2020-10-06 RX ORDER — ALBUTEROL SULFATE 0.83 MG/ML
2.5 SOLUTION RESPIRATORY (INHALATION) AS NEEDED
Status: DISCONTINUED | OUTPATIENT
Start: 2020-10-06 | End: 2020-10-06 | Stop reason: HOSPADM

## 2020-10-06 RX ORDER — DEXAMETHASONE SODIUM PHOSPHATE 4 MG/ML
INJECTION, SOLUTION INTRA-ARTICULAR; INTRALESIONAL; INTRAMUSCULAR; INTRAVENOUS; SOFT TISSUE AS NEEDED
Status: DISCONTINUED | OUTPATIENT
Start: 2020-10-06 | End: 2020-10-06 | Stop reason: HOSPADM

## 2020-10-06 RX ORDER — ONDANSETRON 2 MG/ML
INJECTION INTRAMUSCULAR; INTRAVENOUS AS NEEDED
Status: DISCONTINUED | OUTPATIENT
Start: 2020-10-06 | End: 2020-10-06 | Stop reason: HOSPADM

## 2020-10-06 RX ORDER — FENTANYL CITRATE 50 UG/ML
100 INJECTION, SOLUTION INTRAMUSCULAR; INTRAVENOUS ONCE
Status: COMPLETED | OUTPATIENT
Start: 2020-10-06 | End: 2020-10-06

## 2020-10-06 RX ORDER — HYDROMORPHONE HYDROCHLORIDE 1 MG/ML
1 INJECTION, SOLUTION INTRAMUSCULAR; INTRAVENOUS; SUBCUTANEOUS
Status: DISCONTINUED | OUTPATIENT
Start: 2020-10-06 | End: 2020-10-07 | Stop reason: HOSPADM

## 2020-10-06 RX ORDER — ROPIVACAINE HYDROCHLORIDE 2 MG/ML
30 INJECTION, SOLUTION EPIDURAL; INFILTRATION; PERINEURAL
Status: COMPLETED | OUTPATIENT
Start: 2020-10-06 | End: 2020-10-06

## 2020-10-06 RX ORDER — DEXTROSE, SODIUM CHLORIDE, SODIUM LACTATE, POTASSIUM CHLORIDE, AND CALCIUM CHLORIDE 5; .6; .31; .03; .02 G/100ML; G/100ML; G/100ML; G/100ML; G/100ML
75 INJECTION, SOLUTION INTRAVENOUS CONTINUOUS
Status: DISPENSED | OUTPATIENT
Start: 2020-10-06 | End: 2020-10-07

## 2020-10-06 RX ORDER — FENTANYL CITRATE 50 UG/ML
INJECTION, SOLUTION INTRAMUSCULAR; INTRAVENOUS AS NEEDED
Status: DISCONTINUED | OUTPATIENT
Start: 2020-10-06 | End: 2020-10-06 | Stop reason: HOSPADM

## 2020-10-06 RX ORDER — SODIUM CHLORIDE 0.9 % (FLUSH) 0.9 %
5-40 SYRINGE (ML) INJECTION EVERY 8 HOURS
Status: DISCONTINUED | OUTPATIENT
Start: 2020-10-06 | End: 2020-10-07 | Stop reason: HOSPADM

## 2020-10-06 RX ORDER — SUCCINYLCHOLINE CHLORIDE 20 MG/ML
INJECTION INTRAMUSCULAR; INTRAVENOUS AS NEEDED
Status: DISCONTINUED | OUTPATIENT
Start: 2020-10-06 | End: 2020-10-06 | Stop reason: HOSPADM

## 2020-10-06 RX ORDER — NALOXONE HYDROCHLORIDE 0.4 MG/ML
0.1 INJECTION, SOLUTION INTRAMUSCULAR; INTRAVENOUS; SUBCUTANEOUS AS NEEDED
Status: DISCONTINUED | OUTPATIENT
Start: 2020-10-06 | End: 2020-10-06 | Stop reason: HOSPADM

## 2020-10-06 RX ORDER — ONDANSETRON 2 MG/ML
4 INJECTION INTRAMUSCULAR; INTRAVENOUS
Status: DISCONTINUED | OUTPATIENT
Start: 2020-10-06 | End: 2020-10-07 | Stop reason: HOSPADM

## 2020-10-06 RX ORDER — MIDAZOLAM HYDROCHLORIDE 1 MG/ML
2 INJECTION, SOLUTION INTRAMUSCULAR; INTRAVENOUS ONCE
Status: COMPLETED | OUTPATIENT
Start: 2020-10-06 | End: 2020-10-06

## 2020-10-06 RX ORDER — ACETAMINOPHEN 325 MG/1
650 TABLET ORAL
Status: COMPLETED | OUTPATIENT
Start: 2020-10-06 | End: 2020-10-06

## 2020-10-06 RX ORDER — ACETAMINOPHEN 325 MG/1
650 TABLET ORAL EVERY 6 HOURS
Status: DISCONTINUED | OUTPATIENT
Start: 2020-10-06 | End: 2020-10-07 | Stop reason: HOSPADM

## 2020-10-06 RX ORDER — ZOLPIDEM TARTRATE 5 MG/1
5 TABLET ORAL
Status: DISCONTINUED | OUTPATIENT
Start: 2020-10-06 | End: 2020-10-07 | Stop reason: HOSPADM

## 2020-10-06 RX ORDER — GLYCOPYRROLATE 0.2 MG/ML
INJECTION INTRAMUSCULAR; INTRAVENOUS AS NEEDED
Status: DISCONTINUED | OUTPATIENT
Start: 2020-10-06 | End: 2020-10-06 | Stop reason: HOSPADM

## 2020-10-06 RX ORDER — NEOSTIGMINE METHYLSULFATE 1 MG/ML
INJECTION, SOLUTION INTRAVENOUS AS NEEDED
Status: DISCONTINUED | OUTPATIENT
Start: 2020-10-06 | End: 2020-10-06 | Stop reason: HOSPADM

## 2020-10-06 RX ORDER — SODIUM CHLORIDE, SODIUM LACTATE, POTASSIUM CHLORIDE, CALCIUM CHLORIDE 600; 310; 30; 20 MG/100ML; MG/100ML; MG/100ML; MG/100ML
25 INJECTION, SOLUTION INTRAVENOUS CONTINUOUS
Status: DISCONTINUED | OUTPATIENT
Start: 2020-10-06 | End: 2020-10-06 | Stop reason: HOSPADM

## 2020-10-06 RX ORDER — INSULIN LISPRO 100 [IU]/ML
INJECTION, SOLUTION INTRAVENOUS; SUBCUTANEOUS ONCE
Status: DISCONTINUED | OUTPATIENT
Start: 2020-10-06 | End: 2020-10-06 | Stop reason: HOSPADM

## 2020-10-06 RX ADMIN — SODIUM CHLORIDE, SODIUM LACTATE, POTASSIUM CHLORIDE, CALCIUM CHLORIDE, AND DEXTROSE MONOHYDRATE 75 ML/HR: 600; 310; 30; 20; 5 INJECTION, SOLUTION INTRAVENOUS at 13:19

## 2020-10-06 RX ADMIN — MIDAZOLAM HYDROCHLORIDE 2 MG: 2 INJECTION, SOLUTION INTRAMUSCULAR; INTRAVENOUS at 07:19

## 2020-10-06 RX ADMIN — VECURONIUM BROMIDE 2 MG: 1 INJECTION, POWDER, LYOPHILIZED, FOR SOLUTION INTRAVENOUS at 09:13

## 2020-10-06 RX ADMIN — FAMOTIDINE 20 MG: 20 TABLET ORAL at 06:43

## 2020-10-06 RX ADMIN — LIDOCAINE HYDROCHLORIDE 20 MG: 20 INJECTION, SOLUTION EPIDURAL; INFILTRATION; INTRACAUDAL; PERINEURAL at 10:01

## 2020-10-06 RX ADMIN — MIDAZOLAM 2 MG: 1 INJECTION INTRAMUSCULAR; INTRAVENOUS at 07:23

## 2020-10-06 RX ADMIN — SODIUM CHLORIDE 1 G: 9 INJECTION, SOLUTION INTRAVENOUS at 09:38

## 2020-10-06 RX ADMIN — CELECOXIB 200 MG: 100 CAPSULE ORAL at 06:43

## 2020-10-06 RX ADMIN — FENTANYL CITRATE 50 MCG: 50 INJECTION, SOLUTION INTRAMUSCULAR; INTRAVENOUS at 08:39

## 2020-10-06 RX ADMIN — Medication 100 MCG: at 08:03

## 2020-10-06 RX ADMIN — SODIUM CHLORIDE 1000 MG: 900 INJECTION, SOLUTION INTRAVENOUS at 07:13

## 2020-10-06 RX ADMIN — FENTANYL CITRATE 100 MCG: 50 INJECTION, SOLUTION INTRAMUSCULAR; INTRAVENOUS at 07:19

## 2020-10-06 RX ADMIN — FENTANYL CITRATE 50 MCG: 50 INJECTION, SOLUTION INTRAMUSCULAR; INTRAVENOUS at 10:09

## 2020-10-06 RX ADMIN — OXYCODONE 10 MG: 5 TABLET ORAL at 23:02

## 2020-10-06 RX ADMIN — Medication 100 MCG: at 08:11

## 2020-10-06 RX ADMIN — FENTANYL CITRATE 50 MCG: 50 INJECTION, SOLUTION INTRAMUSCULAR; INTRAVENOUS at 07:43

## 2020-10-06 RX ADMIN — MIDAZOLAM HYDROCHLORIDE 2 MG: 2 INJECTION, SOLUTION INTRAMUSCULAR; INTRAVENOUS at 07:42

## 2020-10-06 RX ADMIN — VECURONIUM BROMIDE 3 MG: 1 INJECTION, POWDER, LYOPHILIZED, FOR SOLUTION INTRAVENOUS at 07:56

## 2020-10-06 RX ADMIN — VECURONIUM BROMIDE 2 MG: 1 INJECTION, POWDER, LYOPHILIZED, FOR SOLUTION INTRAVENOUS at 08:39

## 2020-10-06 RX ADMIN — FENTANYL CITRATE 50 MCG: 50 INJECTION, SOLUTION INTRAMUSCULAR; INTRAVENOUS at 09:21

## 2020-10-06 RX ADMIN — SODIUM CHLORIDE 1 G: 900 INJECTION, SOLUTION INTRAVENOUS at 07:30

## 2020-10-06 RX ADMIN — LIDOCAINE HYDROCHLORIDE 80 MG: 20 INJECTION, SOLUTION EPIDURAL; INFILTRATION; INTRACAUDAL; PERINEURAL at 07:43

## 2020-10-06 RX ADMIN — WATER 1 G: 1 INJECTION INTRAMUSCULAR; INTRAVENOUS; SUBCUTANEOUS at 07:47

## 2020-10-06 RX ADMIN — PREGABALIN 75 MG: 75 CAPSULE ORAL at 06:43

## 2020-10-06 RX ADMIN — ACETAMINOPHEN 650 MG: 325 TABLET ORAL at 13:19

## 2020-10-06 RX ADMIN — SODIUM CHLORIDE, SODIUM LACTATE, POTASSIUM CHLORIDE, AND CALCIUM CHLORIDE: 600; 310; 30; 20 INJECTION, SOLUTION INTRAVENOUS at 07:41

## 2020-10-06 RX ADMIN — ONDANSETRON 4 MG: 2 INJECTION INTRAMUSCULAR; INTRAVENOUS at 09:53

## 2020-10-06 RX ADMIN — HYDROMORPHONE HYDROCHLORIDE 0.5 MG: 2 INJECTION, SOLUTION INTRAMUSCULAR; INTRAVENOUS; SUBCUTANEOUS at 10:41

## 2020-10-06 RX ADMIN — Medication 10 ML: at 17:22

## 2020-10-06 RX ADMIN — SODIUM CHLORIDE, SODIUM LACTATE, POTASSIUM CHLORIDE, AND CALCIUM CHLORIDE 25 ML/HR: 600; 310; 30; 20 INJECTION, SOLUTION INTRAVENOUS at 06:44

## 2020-10-06 RX ADMIN — ROPIVACAINE HYDROCHLORIDE 40 MG: 2 INJECTION, SOLUTION EPIDURAL; INFILTRATION at 07:19

## 2020-10-06 RX ADMIN — DOCUSATE SODIUM 50 MG AND SENNOSIDES 8.6 MG 1 TABLET: 8.6; 5 TABLET, FILM COATED ORAL at 13:19

## 2020-10-06 RX ADMIN — GLYCOPYRROLATE 0.4 MG: 0.2 INJECTION INTRAMUSCULAR; INTRAVENOUS at 09:57

## 2020-10-06 RX ADMIN — ACETAMINOPHEN 650 MG: 325 TABLET ORAL at 06:43

## 2020-10-06 RX ADMIN — DOCUSATE SODIUM 50 MG AND SENNOSIDES 8.6 MG 1 TABLET: 8.6; 5 TABLET, FILM COATED ORAL at 17:22

## 2020-10-06 RX ADMIN — OXYCODONE 10 MG: 5 TABLET ORAL at 17:33

## 2020-10-06 RX ADMIN — LIDOCAINE HYDROCHLORIDE 0.1 ML: 10 INJECTION, SOLUTION EPIDURAL; INFILTRATION; INTRACAUDAL; PERINEURAL at 07:24

## 2020-10-06 RX ADMIN — DEXAMETHASONE SODIUM PHOSPHATE 4 MG: 4 INJECTION, SOLUTION INTRAMUSCULAR; INTRAVENOUS at 07:51

## 2020-10-06 RX ADMIN — Medication 3 MG: at 09:57

## 2020-10-06 RX ADMIN — ACETAMINOPHEN 650 MG: 325 TABLET ORAL at 23:03

## 2020-10-06 RX ADMIN — HYDROMORPHONE HYDROCHLORIDE 0.5 MG: 2 INJECTION, SOLUTION INTRAMUSCULAR; INTRAVENOUS; SUBCUTANEOUS at 10:21

## 2020-10-06 RX ADMIN — FENTANYL CITRATE 100 MCG: 50 INJECTION, SOLUTION INTRAMUSCULAR; INTRAVENOUS at 07:23

## 2020-10-06 RX ADMIN — CEFAZOLIN SODIUM 2 G: 2 SOLUTION INTRAVENOUS at 17:22

## 2020-10-06 RX ADMIN — PROPOFOL 200 MG: 10 INJECTION, EMULSION INTRAVENOUS at 07:43

## 2020-10-06 RX ADMIN — ROPIVACAINE HYDROCHLORIDE 60 MG: 2 INJECTION, SOLUTION EPIDURAL; INFILTRATION; PERINEURAL at 07:22

## 2020-10-06 RX ADMIN — SUCCINYLCHOLINE CHLORIDE 160 MG: 20 INJECTION, SOLUTION INTRAMUSCULAR; INTRAVENOUS at 07:43

## 2020-10-06 RX ADMIN — SODIUM CHLORIDE 1 G: 9 INJECTION, SOLUTION INTRAVENOUS at 08:50

## 2020-10-06 NOTE — INTERVAL H&P NOTE
Update History & Physical 
 
The Patient's History and Physical of October 6, 2020 was reviewed with the patient and I examined the patient. There was no change. The surgical site was confirmed by the patient and me. Plan:  The risk, benefits, expected outcome, and alternative to the recommended procedure have been discussed with the patient. Patient understands and wants to proceed with the procedure.  
 
Electronically signed by Leonarda De Santiago MD on 10/6/2020 at 7:27 AM

## 2020-10-06 NOTE — ANESTHESIA PROCEDURE NOTES
Peripheral Block    Start time: 10/6/2020 7:10 AM  End time: 10/6/2020 7:20 AM  Performed by: Summer Lira MD  Authorized by: Summer Lira MD       Pre-procedure: Indications: at surgeon's request, post-op pain management and procedure for pain    Preanesthetic Checklist: patient identified, risks and benefits discussed, site marked, timeout performed, anesthesia consent given and patient being monitored    Timeout Time: 07:10          Block Type:   Block Type:  Lumbar plexus  Laterality:  Left  Monitoring:  Standard ASA monitoring, responsive to questions, continuous pulse ox, oxygen, frequent vital sign checks and heart rate  Injection Technique:  Single shot  Procedures: nerve stimulator    Patient Position: right lateral decubitus  Prep: chlorhexidine    Location:  Sacral area  Needle Type:  Stimuplex  Needle Gauge:  20 G  Needle Localization:  Nerve stimulator and anatomical landmarks  Motor Response comment:   Motor Response: minimal motor response >0.4 mA   Medication Injected:  FentaNYL citrate (PF) injection sedation initial, 100 mcg  midazolam (VERSED) injection, 2 mg  ropivacaine (NAROPIN) 2 mg/mL (0.2 %) injection, 40 mg  Med Admin Time: 10/6/2020 7:19 AM    Assessment:  Number of attempts:  1  Injection Assessment:  Incremental injection every 5 mL, negative aspiration for CSF, no paresthesia, negative aspiration for blood and no intravascular symptoms  Patient tolerance:  Patient tolerated the procedure well with no immediate complications  Location:  PREOP HOLDING    Patient given 2 mg IV Versed and 100 mcg IV Fentanyl for sedation.     10/6/2020     8:16 AM     Eusebio Siu MD
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
## 2020-10-06 NOTE — PERIOP NOTES
TRANSFER - OUT REPORT:    Verbal report given to [address] [place] [address] on [facility]  being transferred to [facility] for routine post - op       Report consisted of patients Situation, Background, Assessment and   Recommendations(SBAR). Information from the following report(s) SBAR and MAR was reviewed with the receiving nurse. Lines:   Peripheral IV 10/06/20 Anterior;Proximal;Right Forearm (Active)   Site Assessment Clean, dry, & intact 10/06/20 1015   Phlebitis Assessment 0 10/06/20 1015   Infiltration Assessment 0 10/06/20 1015   Dressing Status Clean, dry, & intact 10/06/20 1015   Dressing Type Tape;Transparent 10/06/20 1015   Hub Color/Line Status Pink; Infusing 10/06/20 1015        Opportunity for questions and clarification was provided.       Patient transported with:   [employer]

## 2020-10-06 NOTE — PROGRESS NOTES
Problem: Falls - Risk of  Goal: *Absence of Falls  Description: Document Keerthi Barraza Fall Risk and appropriate interventions in the flowsheet. Outcome: Progressing Towards Goal  Note: Fall Risk Interventions:  Mobility Interventions: OT consult for ADLs, Patient to call before getting OOB, Utilize walker, cane, or other assistive device         Medication Interventions: Patient to call before getting OOB, Teach patient to arise slowly                   Problem: Falls - Risk of  Goal: *Absence of Falls  Description: Document Keerthi Barraza Fall Risk and appropriate interventions in the flowsheet.   Outcome: Progressing Towards Goal  Note: Fall Risk Interventions:  Mobility Interventions: OT consult for ADLs, Patient to call before getting OOB, Utilize walker, cane, or other assistive device         Medication Interventions: Patient to call before getting OOB, Teach patient to arise slowly                   Problem: Patient Education: Go to Patient Education Activity  Goal: Patient/Family Education  Outcome: Progressing Towards Goal     Problem: Pain  Goal: *Control of Pain  Outcome: Progressing Towards Goal     Problem: Patient Education: Go to Patient Education Activity  Goal: Patient/Family Education  Outcome: Progressing Towards Goal     Problem: Patient Education: Go to Patient Education Activity  Goal: Patient/Family Education  Outcome: Progressing Towards Goal

## 2020-10-06 NOTE — BRIEF OP NOTE
Brief Postoperative Note    Patient: Stanley Palacios  YOB: 1965  MRN: [id number]    Date of Procedure: 10/6/2020     Pre-Op Diagnosis: Avascular necrosis of bone of left hip ([address] [M87.052]  Post-traumatic osteoarthritis of left hip [M16.52]    Post-Op Diagnosis: Same as preoperative diagnosis. Procedure(s):  LEFT TOTAL HIP ARTHROPLASTY/BIOMET/2 SA'S    Surgeon(s):  Ann Blanc MD    Surgical Assistant: Kathleen Haro    Anesthesia: General     Estimated Blood Loss (mL): 738     Complications: None    Specimens: * No specimens in log *     Implants:   Implant Name Type Inv.  Item Serial No.  Lot No. LRB No. Used Action   SCR BNE ACET ST TRIL 6.5X15MM --  - [id number]  SCR BNE ACET ST TRIL 6.5X15MM --   [employer]_ [id number] Left 1 Implanted   SHELL ACET OD54MM ID24MM PPS LIMIT H FIN RINGLOC + - [id number]  SHELL ACET OD54MM ID24MM PPS LIMIT H FIN RINGLOC +  [employer]WD [id number] Left 1 Implanted   PLUG ACET DIA3/8-24MM UHMWPE APCL H MOD REGENEREX RINGLOC - [id number]  PLUG ACET DIA3/8-24MM UHMWPE APCL H MOD REGENEREX RINGLOC  [employer]WD [id number] Left 1 Implanted   LINER ACET SZ 24MM OD40MM +3MM UHMWPE MAX ROM RINGLOC + - [id number]  LINER ACET SZ 24MM OD40MM +3MM UHMWPE MAX ROM RINGLOC +  [employer]WD [id number] Left 1 Implanted   STEM FEM SZ 16 L152MM 133DEG DSTL HIP PPS STD OFFSET TYP 1 - [id number]  STEM FEM SZ 16 L152MM 133DEG DSTL HIP PPS STD OFFSET TYP 1  [employer]WD [id number] Left 1 Implanted   HEAD FEM [id number] HIP BIOLOX DELT OPT FOR G7 ACET SYS - [id number]  HEAD FEM [id number] HIP BIOLOX DELT OPT FOR G7 ACET SYS  [employer]WD [id number] Left 1 Implanted   SLEEVE TAPE ADPT OP BILOX NEG6 --  - [id number]  SLEEVE TAPE ADPT OP BILOX NEG6 --   [employer]WD [id number] Left 1 Implanted       Drains:   Hemovac (Active)       Findings: above    Electronically Signed by Andra Anderson MD on 10/6/2020 at 9:50 AM

## 2020-10-06 NOTE — ANESTHESIA POSTPROCEDURE EVALUATION
Procedure(s):  LEFT TOTAL HIP ARTHROPLASTY/BIOMET/2 SA'S. general    Anesthesia Post Evaluation      Multimodal analgesia: multimodal analgesia used between 6 hours prior to anesthesia start to PACU discharge  Patient location during evaluation: bedside  Patient participation: complete - patient participated  Level of consciousness: awake  Pain management: adequate  Airway patency: patent  Anesthetic complications: no  Cardiovascular status: stable  Respiratory status: acceptable  Hydration status: acceptable  Post anesthesia nausea and vomiting:  controlled      INITIAL Post-op Vital signs:   Vitals Value Taken Time   /85 10/6/2020 11:34 AM   Temp 36.6 °C (97.9 °F) 10/6/2020 11:34 AM   Pulse 79 10/6/2020 11:38 AM   Resp 21 10/6/2020 11:38 AM   SpO2 100 % 10/6/2020 11:38 AM   Vitals shown include unvalidated device data.

## 2020-10-06 NOTE — OP NOTES
Operative Note      Patient: Ossie Apley     Date of Surgery: 10/6/2020         YOB: 1965      Age:  54 y.o.        LOS:  LOS: 0 days     MRN:   [id number]    Preoperative Diagnosis:  Avascular necrosis of bone of left hip ([address] [M87.052]  Post-traumatic osteoarthritis of left hip [M16.52]    Postoperative Diagnosis: Avascular necrosis of bone of left hip [facility]) [M87.052]      Surgeon:  Win Pearce MD    Assistant:  Zack Siu    Anesthesia:  general anesthesia and lumbar plexus block    Procedure:  Procedure(s):  LEFT TOTAL HIP ARTHROPLASTY/BIOMET/2 SA'S    Time out performed: YES    Estimated Blood Loss:  150 cc           Implants:    Implant Name Type Inv.  Item Serial No.  Lot No. LRB No. Used Action   SCR BNE ACET ST TRIL 6.5X15MM --  - [id number]  SCR BNE ACET ST TRIL 6.5X15MM --   [employer]WD [id number] Left 1 Implanted   SHELL ACET OD54MM ID24MM PPS LIMIT H FIN RINGLOC + - [id number]  SHELL ACET OD54MM ID24MM PPS LIMIT H FIN RINGLOC +  [employer]WD [id number] Left 1 Implanted   PLUG ACET DIA3/8-24MM UHMWPE APCL H MOD REGENEREX RINGLOC - [id number]  PLUG ACET DIA3/8-24MM UHMWPE APCL H MOD REGENEREX RINGLOC  [employer] [id number] Left 1 Implanted   LINER ACET SZ 24MM OD40MM +3MM UHMWPE MAX ROM RINGLOC + - [id number]  LINER ACET SZ 24MM OD40MM +3MM UHMWPE MAX ROM RINGLOC +  [employer]WD [id number] Left 1 Implanted   STEM FEM SZ 16 L152MM 133DEG DSTL HIP PPS STD OFFSET TYP 1 - [id number]  STEM FEM SZ 16 L152MM 133DEG DSTL HIP PPS STD OFFSET TYP 1  [employer]WD [id number] Left 1 Implanted   HEAD FEM [id number] HIP BIOLOX DELT OPT FOR G7 ACET SYS - [id number]  HEAD FEM [id number] HIP BIOLOX DELT OPT FOR G7 ACET SYS  [employer]_ [id number] Left 1 Implanted   SLEEVE TAPE ADPT OP BILOX NEG6 --  - [id number]  SLEEVE TAPE ADPT OP BILOX NEG6 --   [employer]WD [id number] Left 1 Implanted       Specimens: * No specimens in log *            Complications:  None      DESCRIPTION OF PROCEDURE: After satisfactory general and lumbar plexus block anesthesia, in the supine position, patient's hip was prepped with Betacept and ChloroPrep solution and draped in the usual fashion for hip replacement. An anterior hip incision was made and carried down through the subcutaneous tissue to the underlying fascia. The tensor fascia was retracted laterally and the anterior hip capsule was exposed. An anterior hip capsulectomy was performed exposing the arthritic hip joint. The femoral neck was osteotomized with an oscillating saw. The femoral head was removed with a corkscrew. The acetabulum was exposed with further capsulectomy and four quadrant retraction. The acetabulum was serially reamed to 1 mm less than the actual socket size of 54 mm. Once the acetabulum was fully prepared, the Ringloc + acetabular component was impacted securely into place in 15 degrees anteversion and 45 degrees abduction. A supplemental 15 mm screw was inserted in the acetabular dome area for additional fixation. The apical dome plug was screwed into place. The E poly liner was then impacted into place with good stability noted. The patient's leg was then brought around to the figure 4 position after first dropping the opposite leg slightly on the split leg table. The proximal femur was serially broached with the taper lock broach to the appropriate implant size of 15 mm. Good broach stability was noted. A trial reduction was carried out. Good stability and equal leg lengths were noted. The hip was then dislocated and the trial components were removed. The wound was irrigated thoroughly with a pulsatile lavage. The 15 mm standard offset Taperloc femoral component was then impacted firmly into place. The 40 mm ceramic head component was impacted firmly into place on the clean Kent taper of the standard offset femoral component.  The hip was reduced and was found to be stable with equal leg lengths. The wound was again irrigated thoroughly, including a dilute Betadine lavage. A Hemovac drain was inserted. Closure was obtained using #2 Vicryl sutures for the fascia, 2-0 Vicryl for the subcutaneous tissues, and staples for the skin. Sterile gauze dressings were applied and Mr. Daphne Marte was transported to the recovery room in good condition. Sponge and needle count was correct.

## 2020-10-06 NOTE — ANESTHESIA PREPROCEDURE EVALUATION
Relevant Problems   No relevant active problems       Anesthetic History   No history of anesthetic complications            Review of Systems / Medical History  Patient summary reviewed and pertinent labs reviewed    Pulmonary          Smoker         Neuro/Psych             Comments: Chronic pain Cardiovascular    Hypertension: poorly controlled              Exercise tolerance: >4 METS     GI/Hepatic/Renal     GERD: well controlled    Renal disease: CRI       Endo/Other        Arthritis     Other Findings              Physical Exam    Airway  Mallampati: II  TM Distance: > 6 cm  Neck ROM: normal range of motion   Mouth opening: Normal     Cardiovascular  Regular rate and rhythm,  S1 and S2 normal,  no murmur, click, rub, or gallop             Dental    Dentition: Poor dentition     Pulmonary  Breath sounds clear to auscultation               Abdominal  GI exam deferred       Other Findings            Anesthetic Plan    ASA: 3  Anesthesia type: general      Post-op pain plan if not by surgeon: peripheral nerve block single    Induction: Intravenous  Anesthetic plan and risks discussed with: Patient

## 2020-10-07 ENCOUNTER — HOME HEALTH ADMISSION (OUTPATIENT)
Dept: HOME HEALTH SERVICES | Facility: HOME HEALTH | Age: 55
End: 2020-10-07
Payer: COMMERCIAL

## 2020-10-07 VITALS
OXYGEN SATURATION: 100 % | RESPIRATION RATE: 18 BRPM | TEMPERATURE: 97.2 F | SYSTOLIC BLOOD PRESSURE: 155 MMHG | BODY MASS INDEX: 22.6 KG/M2 | WEIGHT: 144 LBS | DIASTOLIC BLOOD PRESSURE: 68 MMHG | HEART RATE: 81 BPM | HEIGHT: 67 IN

## 2020-10-07 LAB
HCT VFR BLD AUTO: 31.8 % (ref 36–48)
HGB BLD-MCNC: 11 G/DL (ref 13–16)

## 2020-10-07 PROCEDURE — 97535 SELF CARE MNGMENT TRAINING: CPT

## 2020-10-07 PROCEDURE — 97165 OT EVAL LOW COMPLEX 30 MIN: CPT

## 2020-10-07 PROCEDURE — 74011250636 HC RX REV CODE- 250/636: Performed by: PHYSICIAN ASSISTANT

## 2020-10-07 PROCEDURE — 97116 GAIT TRAINING THERAPY: CPT

## 2020-10-07 PROCEDURE — 85018 HEMOGLOBIN: CPT

## 2020-10-07 PROCEDURE — 74011250637 HC RX REV CODE- 250/637: Performed by: SPECIALIST

## 2020-10-07 PROCEDURE — 74011250636 HC RX REV CODE- 250/636: Performed by: SPECIALIST

## 2020-10-07 PROCEDURE — 36415 COLL VENOUS BLD VENIPUNCTURE: CPT

## 2020-10-07 RX ORDER — KETOROLAC TROMETHAMINE 15 MG/ML
15 INJECTION, SOLUTION INTRAMUSCULAR; INTRAVENOUS
Status: COMPLETED | OUTPATIENT
Start: 2020-10-07 | End: 2020-10-07

## 2020-10-07 RX ORDER — AMOXICILLIN 250 MG
1 CAPSULE ORAL 2 TIMES DAILY
Qty: 30 TAB | Refills: 0 | Status: SHIPPED | OUTPATIENT
Start: 2020-10-07 | End: 2020-10-19

## 2020-10-07 RX ORDER — OXYCODONE HYDROCHLORIDE 5 MG/1
5-10 TABLET ORAL
Qty: 42 TAB | Refills: 0 | Status: SHIPPED | OUTPATIENT
Start: 2020-10-07 | End: 2020-10-12 | Stop reason: SDUPTHER

## 2020-10-07 RX ORDER — ASPIRIN 81 MG/1
81 TABLET ORAL DAILY
Qty: 100 TAB | Refills: 0 | Status: SHIPPED | OUTPATIENT
Start: 2020-10-13

## 2020-10-07 RX ADMIN — CEFAZOLIN SODIUM 2 G: 2 SOLUTION INTRAVENOUS at 01:07

## 2020-10-07 RX ADMIN — OXYCODONE 10 MG: 5 TABLET ORAL at 05:40

## 2020-10-07 RX ADMIN — KETOROLAC TROMETHAMINE 15 MG: 15 INJECTION, SOLUTION INTRAMUSCULAR; INTRAVENOUS at 12:19

## 2020-10-07 RX ADMIN — RIVAROXABAN 10 MG: 10 TABLET, FILM COATED ORAL at 08:17

## 2020-10-07 RX ADMIN — ACETAMINOPHEN 650 MG: 325 TABLET ORAL at 05:40

## 2020-10-07 NOTE — PROGRESS NOTES
Discharge order noted for today. Pt has been accepted to [facility] agency. Met with patient and are agreeable to the transition plan today. Transport has been arranged through his mother. Patient's discharge summary and home health  orders have been forwarded to [address] [place] [address] health  agency. Updated bedside RN, Terrie,  to the transition plan.   Discharge information has been documented on the AVS.         TREVER Ro RN  Care Management  Pager: [phone]

## 2020-10-07 NOTE — PROGRESS NOTES
Problem: Mobility Impaired (Adult and Pediatric)  Goal: *Acute Goals and Plan of Care (Insert Text)  Description: Physical Therapy Goals  Initiated 10/6/2020 and to be accomplished within 7 day(s)  1. Patient will move from supine to sit and sit to supine  in bed with modified independence. 2.  Patient will transfer from bed to chair and chair to bed with modified independence using the least restrictive device. 3.  Patient will perform sit to stand with modified independence. 4.  Patient will ambulate with modified independence for 200 feet with the least restrictive device. 5.  Patient will ascend/descend 8 stairs with handrail(s) with supervision. PLOF: Patient reports he was independent with self care and functional mobility. Outcome: Progressing Towards Goal     PHYSICAL THERAPY TREATMENT    Patient: Marjorie Rocha (54 y.o. male)  Date: 10/7/2020  Diagnosis: Avascular necrosis of bone of left hip (HCC) [M87.052]  Post-traumatic osteoarthritis of left hip [M16.52]  Osteoarthritis of left hip [M16.12]  Osteoarthritis of left hip [M16.12]   <principal problem not specified>  Procedure(s) (LRB):  LEFT TOTAL HIP ARTHROPLASTY/BIOMET/2 SA'S (Left) 1 Day Post-Op  Precautions: (P) WBAT, Total hip, Fall  PLOF: see above    ASSESSMENT:  Pt cleared for PT treatment session per nursing. Pt received standing up in room with RW, and agreeable to treatment session. Pt states his L LE was feeling achy and wanted to return to bed post session. Pt verbalized 2 total hip precautions, re-educated to all, and demos adherence to all throughout. Pt Mod I for sit to stand transfers with verbal cues for safe sequencing and hand placement with respect to RW management during transfers. Supv for ambulation of 200 ft with RW, verbal cues for heel strike for smoother gait pattern. Instructed in stair negotiation to ascend/descend 8 stairs with Supv and B handrail initially, progressing to R handrail only.  Pt c/o increased pain with ambulation and requesting to return to supine. Pt SBA with additional time for sit to supine and scooting to return to bed and achieve optimal bed positioning. Post session, pt able to verbally teach back all total hip precautions and appropriate sequencing for stair negotiation. Pt left in supine, SCDs and ice pack applied, and all needs met/within reach. From a PT perspective, pt is safe for discharge home, when medically cleared. Recommend RW and home health PT. Progression toward goals:   [x]      Improving appropriately and progressing toward goals  []      Improving slowly and progressing toward goals  []      Not making progress toward goals and plan of care will be adjusted     PLAN:  Patient continues to benefit from skilled intervention to address the above impairments. Continue treatment per established plan of care. Discharge Recommendations:  Home Health  Further Equipment Recommendations for Discharge:  rolling walker     SUBJECTIVE:   Patient stated My pain increases with walking.     OBJECTIVE DATA SUMMARY:   Critical Behavior:  Neurologic State: (P) Alert  Orientation Level: (P) Oriented X4  Cognition: (P) Follows commands, Appropriate decision making  Safety/Judgement: (P) Fall prevention, Home safety  Functional Mobility Training:  Bed Mobility:  Sit to Supine: Stand-by assistance; Additional time  Scooting: Stand-by assistance  Transfers:  Sit to Stand: Modified independent  Stand to Sit: Modified independent  Interventions: Safety awareness training;Verbal cues  Balance:  Sitting: Intact  Standing: Intact; With support   Ambulation/Gait Training:  Distance (ft): 200 Feet (ft)  Assistive Device: Walker, rolling  Ambulation - Level of Assistance: Supervision  Gait Abnormalities: Decreased step clearance; Antalgic  Right Side Weight Bearing: Full  Left Side Weight Bearing: As tolerated  Stance: Left decreased  Speed/Sonia: Pace decreased (<100 feet/min)  Step Length: Right shortened;Left shortened  Stairs:  Number of Stairs Trained: 8  Stairs - Level of Assistance: Supervision  Rail Use: Right     Pain:  Pain level pre-treatment: 3/10  Pain level during ambulation:  7/10   Pain level post-treatment: No pain complaints, did not rate post session    Activity Tolerance:   Good  Please refer to the flowsheet for vital signs taken during this treatment. After treatment:   [] Patient left in no apparent distress sitting up in chair  [x] Patient left in no apparent distress in bed  [x] Call bell left within reach  [x] Nursing notified  [] Caregiver present  [] Bed alarm activated  [x] SCDs applied      COMMUNICATION/EDUCATION:   [x]         Role of Physical Therapy in the acute care setting. [x]         Fall prevention education was provided and the patient/caregiver indicated understanding. [x]         Patient/family have participated as able in working toward goals and plan of care. []         Patient/family agree to work toward stated goals and plan of care. []         Patient understands intent and goals of therapy, but is neutral about his/her participation. []         Patient is unable to participate in stated goals/plan of care: ongoing with therapy staff.  []         Other:        Paula Britt PTA   Time Calculation: 16 mins.

## 2020-10-07 NOTE — ROUTINE PROCESS
End of Shift Note Bedside and verbal shift change report given to GEMMA Nettles (On coming nurse) by Titi Ray RN (Off going nurse). Report included the following information:  
   --Procedure Summary 
   --MAR, 
   --Recent Results --Med Rec Status
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
## 2020-10-07 NOTE — PROGRESS NOTES
Reason for Admission:  Avascular necrosis of bone of left hip (HCC) [M87.052]  Post-traumatic osteoarthritis of left hip [M16.52]  Osteoarthritis of left hip [M16.12]  Osteoarthritis of left hip [M16.12]                 RUR Score:    8            Plan for utilizing home health:    yes                      Likelihood of Readmission:   LOW                         Transition of Care Plan:              Initial assessment completed with patient. Cognitive status of patient: oriented to time, place, person and situation. Face sheet information confirmed:  yes. The patient designates his mother Chris Dill and father Shola Santana to participate in his discharge plan and to receive any needed information. This patient lives in a home with mother and father. Patient is able to navigate steps as needed. Prior to hospitalization, patient was considered to be independent with ADLs/IADLS : yes . Patient has a current ACP document on file: no  The mother will be available to transport patient home upon discharge. The patient already has [employer] chair, and raised toilet seat medical equipment available in the home. Patient is not currently active with home health. Patient has not stayed in a skilled nursing facility or rehab. Was  stay within last 60 days : no. This patient is on dialysis :no  List of available Home Health agencies were provided and reviewed with the patient prior to discharge. Freedom of choice signed: yes, for [address]. Currently, the discharge plan is Home with [address] [place] [address]. Home health orders sent to [facility] was notified. The patient states that he can obtain his medications from the pharmacy, and take his medications as directed. Patient's current insurance is [facility] Management Interventions  PCP Verified by CM: Yes  Palliative Care Criteria Met (RRAT>21 & CHF Dx)?: No  Mode of Transport at Discharge:  Other (see comment)(family)  Transition of Care Consult (CM Consult): [address]: Yes  Physical Therapy Consult: Yes  Occupational Therapy Consult: Yes  Speech Therapy Consult: No  Current Support Network: Relative's Home(lives with his parents)  Confirm Follow Up Transport: Family  The Patient and/or Patient Representative was Provided with a Choice of Provider and Agrees with the Discharge Plan?: Yes  Freedom of Choice List was Provided with Basic Dialogue that Supports the Patient's Individualized Plan of Care/Goals, Treatment Preferences and Shares the Quality Data Associated with the Providers?: Yes  Discharge Location  Discharge Placement: Home with home health        NATE BayN RN  Care Management  Pager: [phone]

## 2020-10-07 NOTE — DISCHARGE INSTRUCTIONS
Patient Education        Learning About Total Hip Replacement Surgery  What is total hip replacement surgery? During total hip replacement surgery, your doctor replaces the worn parts of your hip joint with artificial parts made of metal, ceramic, or plastic. You may want this surgery if you have hip pain and trouble moving that you can't treat in other ways. Osteoarthritis or rheumatoid arthritis can cause these types of problems. Another cause is bone loss due to a poor blood supply. Hip replacement is sometimes done after a hip fracture. How is this surgery done? For traditional surgery, your doctor will make a 6- to 10-inch cut (incision) on the side or the back of your hip. The surgery can also be done with one or two smaller cuts. This is called minimally invasive surgery. Another type of surgery is done through a small cut on the front (anterior) of the hip. Your doctor will talk with you about which type of surgery might be best for you. You will get medicine to make you sleep during the surgery. After making the incision, your doctor will:  · Remove the worn bone tissue and cartilage from the hip joint. · Replace the ball at the upper end of your thighbone (femur). · Replace your hip socket with a shell and liner. · Fit the ball into the shell and liner to make a new hip joint. There are two kinds of replacement joints. · Cemented joints. The cement fits between the new joint and the bone. · Uncemented joints. These have a metal coating with many small openings. The bone is shaped to fit the new joint almost perfectly. But there are some small spaces. Over time, the bone grows to fill these small openings. Sometimes, a doctor will use a cemented ball and an uncemented socket. Your doctor can tell you which type of new hip joint is best for you. What can you expect after a total hip replacement? On the day of surgery or the day after, you'll get out of bed with help.  You'll learn how to walk with a walker or crutches. By the time you leave the hospital, you'll be able to safely sit down and stand up, dress yourself, use the toilet, bathe, and use stairs. Brenda Angulo probably take medicine to prevent blood clots for a few weeks. You'll be taught how to move your body without dislocating your hip. Until your hip is healed, you'll need to follow \"hip precautions. \" Your doctor and physical therapist will tell you how to do this. At home, you'll be able to move around with crutches or a walker. But you'll need help for a few weeks. You may need physical therapy for several weeks after leaving the hospital. At home you'll keep doing the exercises you learned. It usually takes 3 to 6 months to get back to full activity. Follow-up care is a key part of your treatment and safety. Be sure to make and go to all appointments, and call your doctor if you are having problems. It's also a good idea to know your test results and keep a list of the medicines you take. Where can you learn more? Go to http://www.gray.com/  Enter V393 in the search box to learn more about \"Learning About Total Hip Replacement Surgery. \"  Current as of: March 2, 2020               Content Version: 12.6  © 2898-6392 [employer]. Care instructions adapted under license by [employer] (which disclaims liability or warranty for this information). If you have questions about a medical condition or this instruction, always ask your healthcare professional. [address] any warranty or liability for your use of this information.

## 2020-10-07 NOTE — PROGRESS NOTES
10/06/20 1722   Hemovac   Placement Date/Time: 10/06/20 0936   Description (optional): Hemovac   Present on Admission/Arrival: No   Site Assessment Clean, dry, & intact   Dressing Status Clean, dry, & intact   Drainage Description Sanguinous   Status Charged;Draining   Output (ml) 300 ml     Spoke with Dr. Kimi Quinones regarding patient's Hemovac output.  Received verbal order with readback to place STAT order for H&H.

## 2020-10-07 NOTE — ROUTINE PROCESS
I have reviewed discharge  instruction with the patient. The patient verbalized understanding. Discharged medications reviewed with patient and appropriate educational materials and adverse effects of medications teaching were provided. Patient's VS was stable and Pt is cleared by OT/PT. Patient armband was removed and shredded. Patient is cleared for discharge. Escorted via wheelchair, all belonging sent with patient
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
## 2020-10-07 NOTE — PROGRESS NOTES
Problem: Falls - Risk of  Goal: *Absence of Falls  Description: Document Mdadi Iglesias Fall Risk and appropriate interventions in the flowsheet.   Outcome: Progressing Towards Goal  Note: Fall Risk Interventions:  Mobility Interventions: Utilize walker, cane, or other assistive device, PT Consult for mobility concerns, Patient to call before getting OOB, OT consult for ADLs         Medication Interventions: Patient to call before getting OOB, Teach patient to arise slowly                   Problem: Patient Education: Go to Patient Education Activity  Goal: Patient/Family Education  Outcome: Progressing Towards Goal     Problem: Pain  Goal: *Control of Pain  Outcome: Progressing Towards Goal     Problem: Patient Education: Go to Patient Education Activity  Goal: Patient/Family Education  Outcome: Progressing Towards Goal     Problem: Patient Education: Go to Patient Education Activity  Goal: Patient/Family Education  Outcome: Progressing Towards Goal     Problem: Patient Education: Go to Patient Education Activity  Goal: Patient/Family Education  Outcome: Progressing Towards Goal     Problem: Patient Education: Go to Patient Education Activity  Goal: Patient/Family Education  Outcome: Progressing Towards Goal

## 2020-10-07 NOTE — HOME CARE
Received [facility] referral for SN,PT,Fran Hip protocol; Discharge order noted for today; patient states he has DME: has RW, RTS and shower chair; Verified demographics and explained [facility] services and answered all questions; [facility] referral processed to [address] central Intake. ANUP HARMON.

## 2020-10-07 NOTE — PROGRESS NOTES
OT order received and chart reviewed. Patient was seen for OT evaluation and is cleared for DC. OT to recommend home health safety eval upon DC. Patient received AE (reacher, long handled sponge, sock aid and long handled shoe horn) this session. No DME needs at this time. Full note to follow.     Thank you for this referral.   Lety Perez, OTS

## 2020-10-08 ENCOUNTER — PATIENT OUTREACH (OUTPATIENT)
Dept: CASE MANAGEMENT | Age: 55
End: 2020-10-08

## 2020-10-08 ENCOUNTER — TELEPHONE (OUTPATIENT)
Dept: ORTHOPEDIC SURGERY | Age: 55
End: 2020-10-08

## 2020-10-08 DIAGNOSIS — M87.052 AVASCULAR NECROSIS OF BONE OF LEFT HIP (HCC): Primary | ICD-10-CM

## 2020-10-08 DIAGNOSIS — M16.52 POST-TRAUMATIC OSTEOARTHRITIS OF LEFT HIP: ICD-10-CM

## 2020-10-08 NOTE — TELEPHONE ENCOUNTER
Patient called office to inquire Samantha Yessenia it feels like one of his legs is longer than the other\"? He's requesting a call back to further discuss, [phone].

## 2020-10-08 NOTE — PROGRESS NOTES
Referral for Transition of Care Follow up Received on 10-8-2020. Patient contacted regarding recent discharge and COVID-19 risk education. COVID-19 related testing which was not done at this time or completed prior to admission. Care Transition Nurse/ Ambulatory Care Manager/ LPN Care Coordinator contacted the patient by telephone to perform post discharge assessment. Verified name and  with patient as identifiers. Patient has following risk factors of: renal insufficiency is listed on patient's  Problem list.  CTN/ACM/LPN reviewed discharge instructions, medical action plan and red flags related to discharge diagnosis. Reviewed and educated them on any new and changed medications related to discharge diagnosis. Advance Care Planning:   Does patient have an Advance Directive: none noted at this time    Education provided regarding infection prevention, and signs and symptoms of COVID-19 and when to seek medical attention with patient who verbalized understanding. Discussed exposure protocols and quarantine from [address] you at higher risk for severe illness  and given an opportunity for questions and concerns. The patient agrees to contact the COVID-19 hotline [phone] or PCP office for questions related to their healthcare. CTN/ACM/LPN provided contact information for future reference. From CDC: Are you at higher risk for severe illness?  Wash your hands often.  Avoid close contact (6 feet, which is about two arm lengths) with people who are sick.  Put distance between yourself and other people if COVID-19 is spreading in your community.  Clean and disinfect frequently touched surfaces.  Avoid all cruise travel and non-essential air travel.  Call your healthcare professional if you have concerns about COVID-19 and your underlying condition or if you are sick.     For more information on steps you can take to protect yourself, see CDC's How to Protect Yourself      Patient/family/caregiver given information for GetWell Loop and agrees to enroll yes  Patient's preferred e-mail:  N/a     Patient's preferred phone number:   [phone]. Based on Loop alert triggers, patient will be contacted by nurse care manager for worsening symptoms. Pt will be further monitored by COVID Loop Team based on severity of symptoms and risk factors. Patient states that he is in some pain. He is taking pain medication and it is helping. Patient reports that he is taking Tylenol for pain and that he may take Oxycodone at night so that he may have a better nights sleep. Oxycodone order as prescribed reviewed with patient. Patient states that he did not get his laxative filled as this was not covered by his insurance company. CTN suggested to patient that he could call his physician and see if another medication could possibly be ordered. Patient reports other prescriptions were filled. Patient referred to orthopedic surgeon/staff re: question regarding feeling like one of his legs may be longer than the other. Patient encouraged to follow up with his physician should he have any questions about his medications or if he is in pain or pain worsens or any questions about his medical condition. Patient states that he will call his physician for follow up. Per Chart review [facility] staff is scheduled to visit with patient on 10-9-2020. Patient voiced that he does not have any further concerns of questions at this time.

## 2020-10-09 ENCOUNTER — HOME CARE VISIT (OUTPATIENT)
Dept: HOME HEALTH SERVICES | Facility: HOME HEALTH | Age: 55
End: 2020-10-09

## 2020-10-09 ENCOUNTER — HOME CARE VISIT (OUTPATIENT)
Dept: SCHEDULING | Facility: HOME HEALTH | Age: 55
End: 2020-10-09
Payer: COMMERCIAL

## 2020-10-09 VITALS
OXYGEN SATURATION: 97 % | SYSTOLIC BLOOD PRESSURE: 120 MMHG | TEMPERATURE: 98.3 F | DIASTOLIC BLOOD PRESSURE: 78 MMHG | HEART RATE: 100 BPM

## 2020-10-09 PROCEDURE — G0299 HHS/HOSPICE OF RN EA 15 MIN: HCPCS

## 2020-10-09 PROCEDURE — G0151 HHCP-SERV OF PT,EA 15 MIN: HCPCS

## 2020-10-09 PROCEDURE — 400013 HH SOC

## 2020-10-09 NOTE — TELEPHONE ENCOUNTER
Please let patient know that this is a common occurrence following a hip replacement. We will adjust the lengths appropriate after his first couple visits. He may try a shoe insert in the shorter leg to compensate until he sees us in the office.

## 2020-10-10 ENCOUNTER — HOME CARE VISIT (OUTPATIENT)
Dept: SCHEDULING | Facility: HOME HEALTH | Age: 55
End: 2020-10-10
Payer: COMMERCIAL

## 2020-10-10 PROCEDURE — G0157 HHC PT ASSISTANT EA 15: HCPCS

## 2020-10-11 ENCOUNTER — HOME CARE VISIT (OUTPATIENT)
Dept: SCHEDULING | Facility: HOME HEALTH | Age: 55
End: 2020-10-11
Payer: COMMERCIAL

## 2020-10-11 VITALS
RESPIRATION RATE: 13 BRPM | HEART RATE: 93 BPM | SYSTOLIC BLOOD PRESSURE: 114 MMHG | DIASTOLIC BLOOD PRESSURE: 72 MMHG | TEMPERATURE: 97.4 F | OXYGEN SATURATION: 99 %

## 2020-10-11 VITALS
TEMPERATURE: 98.4 F | SYSTOLIC BLOOD PRESSURE: 140 MMHG | DIASTOLIC BLOOD PRESSURE: 80 MMHG | RESPIRATION RATE: 18 BRPM | OXYGEN SATURATION: 96 % | HEART RATE: 85 BPM

## 2020-10-11 PROCEDURE — G0157 HHC PT ASSISTANT EA 15: HCPCS

## 2020-10-12 ENCOUNTER — HOME CARE VISIT (OUTPATIENT)
Dept: SCHEDULING | Facility: HOME HEALTH | Age: 55
End: 2020-10-12
Payer: COMMERCIAL

## 2020-10-12 ENCOUNTER — HOME CARE VISIT (OUTPATIENT)
Dept: HOME HEALTH SERVICES | Facility: HOME HEALTH | Age: 55
End: 2020-10-12
Payer: COMMERCIAL

## 2020-10-12 VITALS
DIASTOLIC BLOOD PRESSURE: 72 MMHG | TEMPERATURE: 98.6 F | SYSTOLIC BLOOD PRESSURE: 122 MMHG | HEART RATE: 84 BPM | OXYGEN SATURATION: 99 % | RESPIRATION RATE: 13 BRPM

## 2020-10-12 VITALS
OXYGEN SATURATION: 98 % | HEART RATE: 71 BPM | DIASTOLIC BLOOD PRESSURE: 80 MMHG | TEMPERATURE: 97.3 F | SYSTOLIC BLOOD PRESSURE: 128 MMHG

## 2020-10-12 DIAGNOSIS — G89.18 ACUTE POST-OPERATIVE PAIN: ICD-10-CM

## 2020-10-12 PROCEDURE — G0157 HHC PT ASSISTANT EA 15: HCPCS

## 2020-10-12 RX ORDER — OXYCODONE HYDROCHLORIDE 5 MG/1
5 TABLET ORAL
Qty: 28 TAB | Refills: 0 | Status: SHIPPED | OUTPATIENT
Start: 2020-10-12 | End: 2020-10-16 | Stop reason: SDUPTHER

## 2020-10-12 NOTE — TELEPHONE ENCOUNTER
Spoke with patient and relayed Donaldo's recommendations regarding leg length discrepiency. Patient states he is out of his pain medication and his home health nurse told him to call for a refill.

## 2020-10-13 ENCOUNTER — HOME CARE VISIT (OUTPATIENT)
Dept: SCHEDULING | Facility: HOME HEALTH | Age: 55
End: 2020-10-13
Payer: COMMERCIAL

## 2020-10-13 VITALS
TEMPERATURE: 98.6 F | OXYGEN SATURATION: 98 % | DIASTOLIC BLOOD PRESSURE: 76 MMHG | RESPIRATION RATE: 16 BRPM | SYSTOLIC BLOOD PRESSURE: 124 MMHG | HEART RATE: 86 BPM

## 2020-10-13 VITALS
TEMPERATURE: 98 F | OXYGEN SATURATION: 97 % | HEART RATE: 86 BPM | SYSTOLIC BLOOD PRESSURE: 142 MMHG | DIASTOLIC BLOOD PRESSURE: 76 MMHG

## 2020-10-13 PROCEDURE — G0299 HHS/HOSPICE OF RN EA 15 MIN: HCPCS

## 2020-10-13 PROCEDURE — G0157 HHC PT ASSISTANT EA 15: HCPCS

## 2020-10-14 ENCOUNTER — HOME CARE VISIT (OUTPATIENT)
Dept: SCHEDULING | Facility: HOME HEALTH | Age: 55
End: 2020-10-14
Payer: COMMERCIAL

## 2020-10-14 VITALS
SYSTOLIC BLOOD PRESSURE: 159 MMHG | HEART RATE: 86 BPM | OXYGEN SATURATION: 97 % | TEMPERATURE: 97.6 F | DIASTOLIC BLOOD PRESSURE: 77 MMHG

## 2020-10-14 PROCEDURE — G0157 HHC PT ASSISTANT EA 15: HCPCS

## 2020-10-16 ENCOUNTER — OFFICE VISIT (OUTPATIENT)
Dept: ORTHOPEDIC SURGERY | Age: 55
End: 2020-10-16
Payer: COMMERCIAL

## 2020-10-16 ENCOUNTER — HOME CARE VISIT (OUTPATIENT)
Dept: SCHEDULING | Facility: HOME HEALTH | Age: 55
End: 2020-10-16
Payer: COMMERCIAL

## 2020-10-16 VITALS — BODY MASS INDEX: 22.24 KG/M2 | WEIGHT: 142 LBS | TEMPERATURE: 97.4 F

## 2020-10-16 VITALS
SYSTOLIC BLOOD PRESSURE: 153 MMHG | HEART RATE: 93 BPM | OXYGEN SATURATION: 98 % | TEMPERATURE: 97.6 F | DIASTOLIC BLOOD PRESSURE: 83 MMHG

## 2020-10-16 DIAGNOSIS — Z96.642 STATUS POST TOTAL REPLACEMENT OF LEFT HIP: Primary | ICD-10-CM

## 2020-10-16 DIAGNOSIS — G89.18 ACUTE POST-OPERATIVE PAIN: ICD-10-CM

## 2020-10-16 PROCEDURE — G0157 HHC PT ASSISTANT EA 15: HCPCS

## 2020-10-16 PROCEDURE — 99024 POSTOP FOLLOW-UP VISIT: CPT | Performed by: ORTHOPAEDIC SURGERY

## 2020-10-16 RX ORDER — OXYCODONE HYDROCHLORIDE 5 MG/1
5 TABLET ORAL
Qty: 28 TAB | Refills: 0 | Status: SHIPPED | OUTPATIENT
Start: 2020-10-16 | End: 2020-10-30 | Stop reason: SDUPTHER

## 2020-10-16 NOTE — PROGRESS NOTES
Patient: Ossie Apley  YOB: 1965       HISTORY:  The patient presents for reevaluation of his s/p left total hip arthroplasty on 10/6/20. Patient is improved, states pain is a 8 out of 10.  he has participated in H/H physical therapy. has been doing hip exercises at home. Patient denies any fever, chills, chest pain, shortness of breath or calf pain. There are no new illness or injuries to report since last seen in the office. PHYSICAL EXAMINATION:    Visit Vitals  Temp 97.4 °F (36.3 °C) (Temporal)   Wt 142 lb (64.4 kg)   BMI 22.24 kg/m²     The patient is a well-developed, well-nourished male in no acute distress. The patient is alert and oriented times three. The patient appears to be well groomed. Mood and affect are normal.   ORTHOPEDIC EXAM of Left Hip:  Inspection: Effusion present,  incisions clean, dry intact, staples in place  Walk: with a cane today  TTP: incisional  Range of motion:  Hip rotates with minimal discomfort  Stability: Stable   Strength: N/A  2+ distal pulses      IMPRESSION:  Status post Left total hip arthroplasty. Lateral Approach    PLAN:  Pt doing well post operatively  Incisions cleaned. Staples removed and steri strips applied  Surgery was discussed at length today. Stressed to patient that nothing causes an increase in pain or swelling. Patient is weight bearing as tolerated. OK to transition from walker to cane. Will set up outpt physical therapy. Order placed today. Patient given a refill of pain medication. Roxicodone 5 mg Patient given pain medication for short term acute pain relief. Goal is to treat patient according to above plan and to ultimately have patient off all pain medications once appropriate. If chronic pain management is required beyond what is expected for current orthopedic problem, will refer patient to pain management.   was reviewed and will be reviewed with every medication refill request.   Patient will follow up in 2 sangita.    Immanuel Torres PA-C  [facility]

## 2020-10-19 ENCOUNTER — HOME CARE VISIT (OUTPATIENT)
Dept: SCHEDULING | Facility: HOME HEALTH | Age: 55
End: 2020-10-19
Payer: COMMERCIAL

## 2020-10-19 ENCOUNTER — OFFICE VISIT (OUTPATIENT)
Dept: FAMILY MEDICINE CLINIC | Age: 55
End: 2020-10-19
Payer: COMMERCIAL

## 2020-10-19 ENCOUNTER — TELEPHONE (OUTPATIENT)
Dept: SURGICAL ICU | Age: 55
End: 2020-10-19

## 2020-10-19 VITALS
WEIGHT: 141 LBS | RESPIRATION RATE: 16 BRPM | HEART RATE: 97 BPM | HEIGHT: 67 IN | BODY MASS INDEX: 22.13 KG/M2 | OXYGEN SATURATION: 99 % | DIASTOLIC BLOOD PRESSURE: 84 MMHG | SYSTOLIC BLOOD PRESSURE: 144 MMHG | TEMPERATURE: 98.1 F

## 2020-10-19 DIAGNOSIS — Z96.642 STATUS POST TOTAL REPLACEMENT OF LEFT HIP: ICD-10-CM

## 2020-10-19 DIAGNOSIS — R03.0 ELEVATED BLOOD PRESSURE READING: Primary | ICD-10-CM

## 2020-10-19 DIAGNOSIS — Z12.11 SCREENING FOR MALIGNANT NEOPLASM OF COLON: ICD-10-CM

## 2020-10-19 PROCEDURE — 99213 OFFICE O/P EST LOW 20 MIN: CPT | Performed by: INTERNAL MEDICINE

## 2020-10-19 PROCEDURE — G0157 HHC PT ASSISTANT EA 15: HCPCS

## 2020-10-19 NOTE — PROGRESS NOTES
Efraíndewayne Cardozatristin presents today for surgical follow up   - Is someone accompanying this pt? no  - Is the patient using any durable medical equipment during office visit? no    Coordination of Care:  1. Have you been to the ER, urgent care clinic since your last visit? Hospitalized since your last visit? 10/6/20    2. Have you seen or consulted any other health care providers outside of the [address] [place] [address] since your last visit? Include any pap smears or colon screening.  Surgeon

## 2020-10-19 NOTE — TELEPHONE ENCOUNTER
Orthopedic Coordinator Post Op Call    Surgical follow-up call completed. Patient reports the following:  Nausea: No  Fever: No  Shortness of Breath: No  Wound concerns: No  Pain manageable with prescribed medications: Yes. Waking up at night with severe pain. Received a refill at last appointment. Bowel movement since surgery: Yes    Reviewed medication side effects and incentive spirometry. Opportunity for questions and clarification provided. No concerns at this time.     NATE NathanN, RN, [address] [place] [address]  Orthopedic

## 2020-10-19 NOTE — PROGRESS NOTES
History of Present Illness  Buddy Garcia is a 54 y.o. male who presents today for management of    Chief Complaint   Patient presents with    Hip Pain       Patient underwent left total hip arthroplasty on 10/6/2020. He is currently on physical therapy. He has intermittent post-op pain. He takes oxycodone for pain. Patient reports of feeling stressed. He has a strained relationship with her mother. Problem List  Patient Active Problem List    Diagnosis Date Noted    Osteoarthritis of left hip 10/06/2020    Post-traumatic osteoarthritis of left hip 07/20/2020    Renal insufficiency 07/20/2020       Current Medications  Current Outpatient Medications   Medication Sig    oxyCODONE IR (ROXICODONE) 5 mg immediate release tablet Take 1 Tab by mouth every six (6) hours as needed for Pain for up to 7 days. Max Daily Amount: 20 mg.    aspirin delayed-release 81 mg tablet Take 1 Tab by mouth daily.  omeprazole (PRILOSEC) 40 mg capsule Take 1 Cap by mouth daily. 30 minutes before breakfast    acetaminophen (TYLENOL) 500 mg tablet Take 2 Tabs by mouth every six (6) hours as needed for Pain. No current facility-administered medications for this visit. Allergies/Drug Reactions  No Known Allergies     Review of Systems  Review of Systems   Constitutional: Negative for chills, fever, malaise/fatigue and weight loss. Respiratory: Negative. Cardiovascular: Negative. Gastrointestinal: Negative. Musculoskeletal: Positive for joint pain (left hip). Neurological: Negative.          Physical Exam  Vital signs:   Vitals:    10/19/20 1047 10/19/20 1049 10/19/20 1107   BP: (!) 170/95 (!) 160/81 (!) 144/84   Pulse: 97     Resp: 16     Temp: 98.1 °F (36.7 °C)     TempSrc: Temporal     SpO2: 99%     Weight: 141 lb (64 kg)     Height: 5' 7\" (1.702 m)         General: alert, oriented, not in distress  Head: scalp normal, atraumatic  Eyes: pupils are equal and reactive, full and intact EOM's  Chest/Lungs: clear breath sounds, no wheezing or crackles  Heart: normal rate, regular rhythm, no murmur  Extremities: no focal deformities, no edema  Skin: no active skin lesions    Assessment/Plan:    1. Elevated blood pressure reading  - monitor  - no medication for now    2. Status post total replacement of left hip  - doing well post-op  - otho follow-up    3. Screening for malignant neoplasm of colon  - REFERRAL TO GASTROENTEROLOGY    Follow-up and Dispositions    · Return in about 1 year (around 10/19/2021) for annual physical exam.           I have discussed the diagnosis with the patient and the intended plan as seen in the above orders. The patient has received an after-visit summary and questions were answered concerning future plans. I have discussed medication side effects and warnings with the patient as well. I have reviewed the plan of care with the patient, accepted their input and they are in agreement with the treatment goals.        Mary Ellen Pierce MD  October 19, 2020

## 2020-10-20 ENCOUNTER — HOME CARE VISIT (OUTPATIENT)
Dept: SCHEDULING | Facility: HOME HEALTH | Age: 55
End: 2020-10-20
Payer: COMMERCIAL

## 2020-10-20 VITALS
OXYGEN SATURATION: 98 % | SYSTOLIC BLOOD PRESSURE: 158 MMHG | HEART RATE: 99 BPM | TEMPERATURE: 98 F | DIASTOLIC BLOOD PRESSURE: 90 MMHG

## 2020-10-20 PROCEDURE — G0299 HHS/HOSPICE OF RN EA 15 MIN: HCPCS

## 2020-10-21 ENCOUNTER — HOME CARE VISIT (OUTPATIENT)
Dept: SCHEDULING | Facility: HOME HEALTH | Age: 55
End: 2020-10-21
Payer: COMMERCIAL

## 2020-10-21 VITALS
SYSTOLIC BLOOD PRESSURE: 124 MMHG | TEMPERATURE: 98.4 F | RESPIRATION RATE: 16 BRPM | DIASTOLIC BLOOD PRESSURE: 70 MMHG | OXYGEN SATURATION: 99 % | HEART RATE: 90 BPM

## 2020-10-21 PROCEDURE — G0151 HHCP-SERV OF PT,EA 15 MIN: HCPCS

## 2020-10-21 PROCEDURE — A6213 FOAM DRG >16<=48 SQ IN W/BDR: HCPCS

## 2020-10-22 ENCOUNTER — HOSPITAL ENCOUNTER (OUTPATIENT)
Dept: PHYSICAL THERAPY | Age: 55
Discharge: HOME | End: 2020-10-22
Payer: COMMERCIAL

## 2020-10-22 VITALS
SYSTOLIC BLOOD PRESSURE: 160 MMHG | HEART RATE: 94 BPM | TEMPERATURE: 98 F | OXYGEN SATURATION: 98 % | DIASTOLIC BLOOD PRESSURE: 80 MMHG

## 2020-10-22 PROCEDURE — 97161 PT EVAL LOW COMPLEX 20 MIN: CPT

## 2020-10-22 NOTE — PROGRESS NOTES
PHYSICAL THERAPY - DAILY TREATMENT NOTE  Patient Name: Arnoldo Sr        Date: 10/22/2020  : 1965   [x]  Patient  Verified  Visit #:     Insurance: Payor: Nick  / Plan: [address] / Product Type: Managed Care Medicaid /      In time:   11:00          Out time:   11:35 Total Treatment Time (min):   35     TTREATMENT AREA = Left hip pain [M25.552]    SUBJECTIVE    Pain Level (on 0 to 10 scale):  5  / 10   Medication Changes/New allergies or changes in medical history, any new surgeries or procedures? []  No    []  Yes   If yes, update Summary List:    Subjective Functional Status/Changes:  []  No changes reported   HISTORY    Present Symptoms/complaints: Left hip stiffness, pain and inflammation     Present since: 10/6/2020  [] Improving []  Unchanging []  Worsening          Commenced as a result of: Angle Christiano into a hole 5-10 years ago. Gradual wear and tear. or []  No apparent reason  Or  Surgery- DOS=  10/6/2020    Constant symptoms:soreness     Intermittent symptoms:stiffness    What produces or worsens:walking, transfers    What stops or reduces:pain medication    Continued use makes the pain:  [] Better [x]  Worse []  No effect     Disturbed night: [] No    [x] Yes    Pain at rest: [] No    [x] Yes       Treatments this episode: HHPT    Previous episodes:chronic      Spinal history:none    Paraesthesia: [] No    [x] Yes     General Health:  [x] Good []  Fair []  Poor     Imaging:   [] Yes [x]  No   F/u X-rays 10/30/2020    Summary:    [] Acute []  Sub-acute []  Chronic     [x] Surgery []  Insidious onset     Work:  Mechanical Stresses:unemployed. Just moved from [facility]: Mechanical Stresses: sedentary, walking     - Car transfers, walking > 1/2 mile, donning/doffing LE cloting.          OBJECTIVE    Gait:  [] Normal    [x] Abnormal    [x] Antalgic    [] NWB    Device:SPC      Neuro Screening  Myotome Level Muscles Nerve Reflex Sensation Action   L1-L3 Iliopsoas T12/L1-3  Quadriceps L2-4  Adductors L2-L4 (Iliacus)- Femoral  Femoral  Obturator/Sciatic N/A  L3-4 = Patella L1- Inguinal Crease  L2- Anterior Thigh  L3- Anterior Thigh above knee Hip Flexion  Knee Extension   L4 Tibialis anterior L4&5   Deep Peroneal Patella Anterior Knee Suprapatellar Ankle Dorsiflexion   L5 Extensor Hallucis Longus  Extensor Digitorum Lungus  Gluteus Medius Deep Peroneal         Superior Gluteal None Reliable Dorsum of Foot/Great Toe  Anterior Shank Extensor  to Great Toe        Hip Internal Rotation   S1 Peroneus Longus  Gastrocnemius & Soleus  Gluteus Aneesh Superficial Peroneal  Tibial    Inferior Gluteal Achilles Tendon Lateral Shank around Lateral Malleolus  Lateral Aspect/Dorsum of GT   Plantar Flexion      Hip Extension   Notable findings from above: unremarkable         ROM/Strength        AROM                     PROM        Strength (1-5)  Hip Left Right Left Right Left Right   Flexion         Extension         Abduction         ER Supine         IR Supine         ER Prone         IR Prone         Knee Left Right Left Right Left Right   Extension         Flexion              Flexibility: [] Unable to assess at this time  Hamstrings:    (L) Tightness= [] WNL   [] Min   [] Mod   [] Severe    (R) Tightness= [] WNL   [] Min   [] Mod   [] Severe  Quadriceps:    (L) Tightness= [] WNL   [] Min   [] Mod   [] Severe    (R) Tightness= [] WNL   [] Min   [] Mod   [] Severe  Gastroc:    (L) Tightness= [] WNL   [] Min   [] Mod   [] Severe    (R) Tightness= [] WNL   [] Min   [] Mod   [] Severe                                  Palpation  [] Min  [] Mod  [] Severe    Location:  [] Min  [] Mod  [] Severe    Location:  [] Min  [] Mod  [] Severe    Location:    Optional Tests  Gene/ Gela Test: [] Neg    [] Pos  Roel Test:  [] Neg    [] Pos  Scouring Test:  [] Neg    [] Pos  Trendelenberg:  [] Neg    [] Pos [] Left    [] Right  OberTest:   [] Neg    [] Pos  Ely's Test:  [] Neg    [] Pos  Piriformis Test:  [] Neg    [] Pos  Functional Leg Length (cm):   Right:  Left:  Discrepancy:    Other tests/ comments:       Post Treatment Pain Level (on 0 to 10) scale:   5  / 10     ASSESSMENT    Assessment/Changes in Function:     Justification for Eval Code Complexity:  Patient History (low 0, mod 1-2, high 3-4): mod  Examination (low 1-2, mod 3+, high 4+): low  Clinical Presentation (low stable or uncomplicated, mod evolving or changing, high unstable or unpredictable): low  Clinical Decision Making (low , mod 26-74, high 1-25): FOTO 43      []  See Progress Note/Recertification   Patient will continue to benefit from skilled PT services to modify and progress therapeutic interventions, address functional mobility deficits, address ROM deficits, address strength deficits, analyze and address soft tissue restrictions, analyze and cue movement patterns, analyze and modify body mechanics/ergonomics and instruct in home and community integration  to attain remaining goals   Progress toward goals / Updated goals:    See POC     PLAN    [x]  Upgrade activities as tolerated YES Continue plan of care   []  Discharge due to :    []  Other:      Therapist: Rachell Austin \"BJ\" Katrina Cox, DPT, Cert. MDT, Cert. DN, Cert. SMT, Dip.  Osteopractic    Date: 10/22/2020 Time: 11:04 AM     Future Appointments   Date Time Provider Kai Rucker   10/30/2020  2:00 PM Cherrie Pritchett PA-C [facility] BS AMB

## 2020-10-22 NOTE — PROGRESS NOTES
[facility] PHYSICAL THERAPY  [address] [facility] Lakisha Sandhu, [address] - Phone: [phone]  Fax: [phone] / [address] [address] PHYSICAL THERAPY SERVICES  Patient Name: Lucille Ocasio : 1965   Medical   Diagnosis: Left hip pain [M25.552] Treatment Diagnosis: Left SARTHAK   Onset Date: 10/6/2020     Ref[facility] 10/[facility]   Prior Hospitalization: See medical history Provider #: [id number]   Prior Level of Function: Chronic left hip pain   Comorbidities: n/a   Medications: Verified on Patient Summary List   The Plan of Care and following information is based on the information from the initial evaluation.   ===========================================================================================  Assessment / key information: Patient is a 54 y.o. male presenting to clinic with [facility] S/[facility] left SARTHAK. Patient reports chronic hx of left hip pain since falling into a hole \"several years ago\". He reports having a sedentary lifestyle prior to his surgery, and has not been [facility] since [facility] and walking to the grocery store. Pain averages 5/10 on VAS, with reliance on [facility] for ambulating in community. He reports limitations in ability to don/doff LE clothing and has disturbed sleep. Left Hip ROM as follows:     Left Hip AROM PROM   Flexion 85  90   Extension 5 8   Abduction 15 18   ER Supine 10 13   IR Supine 2 5   Generalized weakness as to be expected, with no apparent loss in sensory or reflex. We reviewed his precautions and he verbalized understanding. Pt  will benefit from physical therapist management to address his impairments (listed below),  educate him, and improve his level of function.  Thanks for your referral.   ===========================================================================================  Eval Complexity: History: MEDIUM  Complexity : 1-2 comorbidities / personal factors will impact the outcome/ POC Exam:LOW Complexity : 1-2 Standardized tests and measures addressing body structure, function, activity limitation and / or participation in recreation  Presentation: LOW Complexity : Stable, uncomplicated  Clinical Decision Making:MEDIUM Complexity : FOTO score of 26-74Overall Complexity:LOW   Problem List: pain affecting function, decrease ROM, decrease strength, impaired gait/ balance, decrease ADL/ functional abilitiies, decrease activity tolerance, decrease flexibility/ joint mobility and decrease transfer abilities  FOTO score: 43 indicating 57% functional disability  Treatment Plan may include any combination of the following: Therapeutic exercise, Therapeutic activities, Neuromuscular re-education, Physical agent/modality, Gait/balance training, Manual therapy, Patient education, Self Care training, Functional mobility training, Home safety training and Stair training  Patient / Family readiness to learn indicated by: asking questions, trying to perform skills and interest  Persons(s) to be included in education: patient (P)  Barriers to Learning/Limitations: None  Measures taken: n/a   Patient Goal (s): \"Be in less pain and lose the stiffness\"   Patient self reported health status: good  Rehabilitation Potential: excellent   Short Term Goals: To be accomplished in  2-3  weeks:  1. Patient to be adherent to HEP to facilitate pain control with ADL's.  2. Patient to report minimal difficulty donning/doffing LE clothing. 3. Patient to report > 50% improvement in sleep disturbed by left hip pain  4. Patient to demonstrate > 300' ambulation without AD or safety concerns.  Long Term Goals: To be accomplished in  4-6  weeks:  1. Patient to be Safe and Independent with HEP to self-manage/prevent symptoms after DC. 2. Patient to demonstrate safe stair negotiation in reciprocal pattern without AD.   3. Patient to increase FOTO score to > 70 to indicate improved functional independence. 4. Patient to initiate [employer] Walking Program to facilitate wellness and community mobility. 5. Patient to demonstrate > 30' uninterrupted standing tolerance to facilitate household activity tolerance. Frequency / Duration:   Patient to be seen  2-3  times per week for 4-6  weeks:  Patient / Caregiver education and instruction: activity modification and exercises. We reviewed our facility's Patient Personal Responsibilities (PPR) form, particularly in regards to compliance towards his appointment time, our attendance policy, and his home exercise program. Patient was informed of possible discharge for non-compliance to our attendance policy per PPR form. We also discussed his POC as deemed appropriate by the treating therapist and physician. Patient verbalized understanding that he must show objective and functional improvement in an appropriate time frame. Patient verbalized understanding that should progress or compliance be lacking, we will contact the referring physician for further consultation to address and attempt to establish alternate treatment strategies as necessary and/or possibly discharge. Therapist Signature: Severo Richard \"BJ\" Teri Hernandez, KIARA, Cert. MDT, Cert. DN, Cert. SMT, Dip. Osteopractic Date: 36/65/7619   Certification Period: n/a Time: 11:57 AM   ===========================================================================================  I certify that the above Physical Therapy Services are being furnished while the patient is under my care. I agree with the treatment plan and certify that this therapy is necessary. Physician Signature:        Date:       Time:     Please sign and return to [facility] or you may fax the signed copy to [phone]. Thank you.

## 2020-10-27 ENCOUNTER — HOSPITAL ENCOUNTER (OUTPATIENT)
Dept: PHYSICAL THERAPY | Age: 55
Discharge: HOME | End: 2020-10-27
Payer: COMMERCIAL

## 2020-10-27 PROCEDURE — 97112 NEUROMUSCULAR REEDUCATION: CPT

## 2020-10-27 PROCEDURE — 97530 THERAPEUTIC ACTIVITIES: CPT

## 2020-10-27 PROCEDURE — 97110 THERAPEUTIC EXERCISES: CPT

## 2020-10-27 NOTE — PROGRESS NOTES
PHYSICAL THERAPY - DAILY TREATMENT NOTE    Patient Name: Renita Pcek        Date: 10/27/2020  : 1965   YES Patient  Verified  Visit #:   2     Insurance: Payor: Nick Real / Plan: [address] / Product Type: Managed Care Medicaid /      In time: 2:00 Out time: 2:40   Total Treatment Time: 40       TREATMENT AREA = Left hip pain [M25.552]    SUBJECTIVE    Pain Level (on 0 to 10 scale):  5   10   Medication Changes/New allergies or changes in medical history, any new surgeries or procedures? NO    If yes, update Summary List   Subjective Functional Status/Changes:  []  No changes reported     \"It stiffened up on me yesterday and I've been stiff since. Hope I can loosen up\"          OBJECTIVE     Therapeutic Procedures:  Min Procedure Specifics + Rationale   n/a [x]  Patient Education (performed throughout session) [x] Review HEP    [] Progressed/Changed HEP based on:   [] proper performance and advancement of Therex/TA   [] reduction in pain level    [] increased functional capacity       [] change in directional preference   10 [x] Therapeutic Exercise   [x]  See Flowsheet   Rationale: increase ROM and increase strength to improve the patients ability to participate in ADL's    10(10) [x] Therapeutic Activity   [x]  See Flowsheet  Rationale:  To improve safety, proprioception, coordination, and efficiency with tasks   10(10) [x] Neuromuscular Re-ed   [x]  See Flowsheet  Rationale: increase ROM, increase strength, improve coordination, improve balance and increase proprioception  to improve the patients ability to safely participate in ADL's     Modality rationale: decrease inflammation, decrease pain, increase tissue extensibility and increase muscle contraction/control to improve the patients ability to perform ADL's with greater ease       Min Type Additional Details   10 [x]  Cold Pack   [] pre-ELIA      [x] post-ELIA  []  Ice massage Location:left hip    [x] supine              [] prone  [] legs elevated    [x] legs flat   [] sitting   [x] Skin assessment post-treatment:  [x]intact [x]redness- no adverse reaction       []redness  adverse reaction:     Other Objective/Functional Measures:    Initiated therex  Educated patient re: DOMS     Post Treatment Pain Level (on 0 to 10) scale:   0  / 10     ASSESSMENT    Assessment/Changes in Function:       Good performance of therex, requiring minimal cueing         Patient will continue to benefit from skilled PT services to modify and progress therapeutic interventions, address functional mobility deficits, address ROM deficits, address strength deficits, analyze and address soft tissue restrictions, analyze and cue movement patterns, analyze and modify body mechanics/ergonomics and instruct in home and community integration  to attain remaining goals   Progress toward goals / Updated goals:    1st session since initial eval, no significant progress noted in return to function       PLAN    [x]  Upgrade activities as tolerated  [x]  Update interventions per flow sheet YES Continue plan of care   []  Discharge due to :    []  Other:      Therapist: Addi Dunne \"BJ\" Sweta Harrisn, DPT, Cert. MDT, Cert. DN, Cert. SMT, Dip.  Osteopractic    Date: 10/27/2020 Time: 1:30 PM     Future Appointments   Date Time Provider Kai Rucker   10/27/2020  2:00 PM Ellen Gomez PT [facility]   10/29/2020  2:45 PM Tamela ENRIQUEPTTAMANNA [facility]   10/30/2020  2:00 PM Rosa POTTER BS AMB   11/3/2020  2:00 PM Ellen Gomez PT [facility]   11/5/2020  2:00 PM Ellen Gomez PT [facility]   11/10/2020  2:00 PM Tamela GILL [facility]   11/12/2020  2:00 PM Tamela Quan [facility]   11/17/2020  2:00 PM Ellen Gomez PT [facility]   11/19/2020  2:00 PM Ellen Gomez, PT [facility]   11/23/2020  2:00 PM Ellen Gomez, PT [facility]   11/25/2020  2:00 PM Ellen Gomez, PT [facility]   11/30/2020  2:00 PM Tamela Quan [facility]

## 2020-10-28 ENCOUNTER — HOSPITAL ENCOUNTER (EMERGENCY)
Age: 55
Discharge: HOME | End: 2020-10-28
Attending: EMERGENCY MEDICINE
Payer: COMMERCIAL

## 2020-10-28 VITALS
TEMPERATURE: 97.7 F | DIASTOLIC BLOOD PRESSURE: 90 MMHG | SYSTOLIC BLOOD PRESSURE: 114 MMHG | HEART RATE: 75 BPM | RESPIRATION RATE: 15 BRPM | OXYGEN SATURATION: 96 %

## 2020-10-28 DIAGNOSIS — M25.552 ACUTE POSTOPERATIVE PAIN OF LEFT HIP: Primary | ICD-10-CM

## 2020-10-28 DIAGNOSIS — G89.18 ACUTE POSTOPERATIVE PAIN OF LEFT HIP: Primary | ICD-10-CM

## 2020-10-28 PROCEDURE — 74011250637 HC RX REV CODE- 250/637: Performed by: EMERGENCY MEDICINE

## 2020-10-28 PROCEDURE — 99283 EMERGENCY DEPT VISIT LOW MDM: CPT

## 2020-10-28 RX ORDER — OXYCODONE AND ACETAMINOPHEN 5; 325 MG/1; MG/1
2 TABLET ORAL
Status: COMPLETED | OUTPATIENT
Start: 2020-10-28 | End: 2020-10-28

## 2020-10-28 RX ADMIN — OXYCODONE HYDROCHLORIDE AND ACETAMINOPHEN 2 TABLET: 5; 325 TABLET ORAL at 23:06

## 2020-10-29 ENCOUNTER — HOSPITAL ENCOUNTER (OUTPATIENT)
Dept: PHYSICAL THERAPY | Age: 55
Discharge: HOME | End: 2020-10-29
Payer: COMMERCIAL

## 2020-10-29 ENCOUNTER — PATIENT OUTREACH (OUTPATIENT)
Dept: CASE MANAGEMENT | Age: 55
End: 2020-10-29

## 2020-10-29 PROCEDURE — 97110 THERAPEUTIC EXERCISES: CPT

## 2020-10-29 PROCEDURE — 97112 NEUROMUSCULAR REEDUCATION: CPT

## 2020-10-29 PROCEDURE — 97530 THERAPEUTIC ACTIVITIES: CPT

## 2020-10-29 NOTE — PROGRESS NOTES
PHYSICAL THERAPY - DAILY TREATMENT NOTE    Patient Name: Rossy Crane        Date: 10/29/2020  : 1965   YES Patient  Verified  Visit #:   3     Insurance: Payor: Nick Real / Plan: [address] / Product Type: Managed Care Medicaid /      In time: 2:00 Out time: 2:49   Total Treatment Time: 49     Medicare/BCBS [place] Time Tracking (below)   Total Timed Codes (min):  na 1:1 Treatment Time:  na     TREATMENT AREA =  Left hip pain [M25.552]    SUBJECTIVE    Pain Level (on 0 to 10 scale):  3  / 10   Medication Changes/New allergies or changes in medical history, any new surgeries or procedures? NO    If yes, update Summary List   Subjective Functional Status/Changes:  []  No changes reported   \"I've been out of pain medication for 2 weeks, the over the counter stuff just isn't doing it for me, I can't sleep. \"    Pt reports having increased pain left hip after last PT session (Tuesday 10/27/2020), reports he went to the ER yesterday because of pain, they gave him 2 Percocet's and he has a follow up with his hip doctor tomorrow. OBJECTIVE    Modalities Rationale: decrease pain and increase tissue extensibility to improve patient's ability to perform ADL's with less pain.     min [] Estim, type/location:                                      []  att     []  unatt     []  w/US     []  w/ice    []  w/heat    min []  Mechanical Traction: type/lbs                   []  pro   []  sup   []  int   []  cont    []  before manual    []  after manual    min []  Ultrasound, settings/location:      min []  Iontophoresis w/ dexamethasone, location:                                               []  take home patch       []  in clinic   10 min []  Ice     [x]  Heat    location/position: MHP to left hip, pt Supine with LE wedge    min []  Vasopneumatic Device, press/temp:     min []  Other:    [x] Skin assessment post-treatment (if applicable):    [x]  intact    []  redness- no adverse reaction     []redness  adverse reaction:      14 min Therapeutic Exercise:  [x]  See flow sheet   Rationale:   Increase LE ROM/flexibility, increase strength and increase proprioception to improve the patients ability to perform ADL's and gait safely and I to allow for increased activity tolerance and increased functional mobility. 10 min Therapeutic Activity: bridging and bed mobility, sit<>stand    Rationale:  increase ROM, increase strength, improve coordination, improve balance and increase proprioception to promote increased functional mobility and increased activity tolerance with ADL's. 10 min Neuromuscular Re-ed: WSing: lateral WSing and trunk rotation in stance, stagger stance with ant<>post WSing    Rationale:     improve coordination, improve balance and increase proprioception to improve the patients ability to perform ADLs, transfers and gait safely and independently. 5 min Manual Therapy: The manual therapy interventions were performed at a separate and distinct time from the therapeutic activities interventions. STM to piriformis, glutes and proximal lateral quads (pt in right S/L with pillow between knees)   Rationale:  decrease pain, increase tissue extensibility and decrease trigger points to improve patient's ability to perform ADL's with greater ease and less pain. min Patient Education:  YES  Reviewed HEP   [x]  Progressed/Changed HEP based on:   See copy of HEP in chart. Advised pt to hold on hip flexor stretch until trial in clinic. Pt agreeable. Other Objective/Functional Measures:    Decreased time on NuStep, pt reports \"tingling\" sensation left posterior hip. Pt reports minimal to moderate ms fatigue with therapeutic exercise and only c/o increased pain with mod Roel stretch. Pt c/o pain in left lateral hip with attempting mod Roel stretch, advised pt to hold this ex.       Post Treatment Pain Level (on 0 to 10) scale:   3  / 10 ASSESSMENT    Assessment/Changes in Function:     Pt reporting DOMS after initiation of therapeutic exercise last session. []  See Progress Note/Recertification   Patient will continue to benefit from skilled PT services to modify and progress therapeutic interventions, address functional mobility deficits, address ROM deficits, address strength deficits, analyze and address soft tissue restrictions, analyze and cue movement patterns, assess and modify postural abnormalities and instruct in home and community integration to attain remaining goals. Progress toward goals / Updated goals:    1. Patient to be adherent to HEP to facilitate pain control with ADL's. issued HEP 10-  2. Patient to report minimal difficulty donning/doffing LE clothing. 3. Patient to report > 50% improvement in sleep disturbed by left hip pain  4. Patient to demonstrate > 300' ambulation without AD or safety concerns.      PLAN    []  Upgrade activities as tolerated YES Continue plan of care   []  Discharge due to :    []  Other:      Therapist: Mee Litten, PTA    Date: 10/29/2020 Time: 3:07 PM     Future Appointments   Date Time Provider Kai Rucker   10/30/2020  2:00 PM Ajit JOHN BS AMB   11/3/2020  2:00 PM Raimundo Sims PT [facility]   11/5/2020  2:00 PM Raimundo Sims PT [facility]   11/10/2020  2:00 PM Revonda Mins [facility]   11/12/2020  2:00 PM Revonda Mins [facility]   11/17/2020  2:00 PM Raimundo Sims PT [facility]   11/19/2020  2:00 PM Raimundo Sims PT [facility]   11/23/2020  2:00 PM Raimundo Sims PT [facility]   11/25/2020  2:00 PM Raimundo Sims PT [facility]   11/30/2020  2:00 PM Revonda Mins [facility]

## 2020-10-29 NOTE — ED TRIAGE NOTES
Pt ambulatory with EMS for L hip pain s/p total replacement at [facility] 10/6. On ASA. Denies injury trauma since operation. Last narcotic-2 weeks ago.  Started PT yesterday

## 2020-10-29 NOTE — DISCHARGE INSTRUCTIONS
Patient Education        Acute Pain After Surgery: Care Instructions  Your Care Instructions     It's common to have some pain after surgery. Pain doesn't mean that something is wrong or that the surgery didn't go well. But when the pain is severe, it's important to work with your doctor to manage it. It's also important to be aware of a few facts about pain and pain medicine. · You are the only person who knows what your pain feels like. So be sure to tell your doctor when you are in pain or when the pain changes. Then he or she will know how to adjust your medicines. · Pain is often easier to control right after it starts. So it may be better to take regular doses of pain medicine and not wait until the pain gets bad. · Medicine can help control pain. But this doesn't mean you'll have no pain. Medicine works to keep the pain at a level you can live with. With time, you will feel better. Follow-up care is a key part of your treatment and safety. Be sure to make and go to all appointments, and call your doctor if you are having problems. It's also a good idea to know your test results and keep a list of the medicines you take. How can you care for yourself at home? · Be safe with medicines. Read and follow all instructions on the label. ? If the doctor gave you a prescription medicine for pain, take it as prescribed. ? If you are not taking a prescription pain medicine, ask your doctor if you can take an over-the-counter medicine. · If you take an over-the-counter pain medicine, such as acetaminophen (Tylenol), ibuprofen (Advil, Motrin), or naproxen (Aleve), read and follow all instructions on the label. · Do not take two or more pain medicines at the same time unless the doctor told you to. · Do not drink alcohol while you are taking pain medicines. · Try to walk each day if your doctor recommends it. Start by walking a little more than you did the day before. Bit by bit, increase the amount you walk. Walking increases blood flow. It also helps prevent pneumonia and constipation. · To prevent constipation from opioid pain medicines:  ? Talk to your doctor about a laxative. ? Include fruits, vegetables, beans, and whole grains in your diet each day. These foods are high in fiber. ? Drink plenty of fluids, enough so that your urine is light yellow or clear like water. Drink water, fruit juice, or other drinks that do not contain caffeine or alcohol. If you have kidney, heart, or liver disease and have to limit fluids, talk with your doctor before you increase the amount of fluids you drink. ? Take a fiber supplement, such as Citrucel or Metamucil, every day if needed. Read and follow all instructions on the label. If you take pain medicine for more than a few days, talk to your doctor before you take fiber. When should you call for help? Call your doctor now or seek immediate medical care if:    · Your pain gets worse.     · Your pain is not controlled by medicine. Watch closely for changes in your health, and be sure to contact your doctor if you have any problems. Where can you learn more? Go to http://www.gilmore.com/  Enter H549 in the search box to learn more about \"Acute Pain After Surgery: Care Instructions. \"  Current as of: June 26, 2019               Content Version: 12.6  © 9730-1794 [employer]. Care instructions adapted under license by [employer] (which disclaims liability or warranty for this information). If you have questions about a medical condition or this instruction, always ask your healthcare professional. [place] [address] any warranty or liability for your use of this information.

## 2020-10-29 NOTE — PROGRESS NOTES
Patient contacted regarding recent discharge and COVID-19 risk. Discussed COVID-19 related testing which was not done at this time. Test results were not done. Patient informed of results, if available?     Care Transition Nurse/ Ambulatory Care Manager/ LPN Care Coordinator contacted the patient by telephone to perform post discharge assessment. Verified name and  with patient as identifiers. Patient has following risk factors of: no known risk factors. CTN/ACM/LPN reviewed discharge instructions, medical action plan and red flags related to discharge diagnosis. Reviewed and educated them on any new and changed medications related to discharge diagnosis. Advised obtaining a 90-day supply of all daily and as-needed medications. Advance Care Planning:   Does patient have an Advance Directive: not on file    Education provided regarding infection prevention, and signs and symptoms of COVID-19 and when to seek medical attention with patient who verbalized understanding. Discussed exposure protocols and quarantine from [address] you at higher risk for severe illness  and given an opportunity for questions and concerns. The patient agrees to contact the COVID-19 hotline [phone] or PCP office for questions related to their healthcare. CTN/ACM/LPN provided contact information for future reference. From CDC: Are you at higher risk for severe illness?  Wash your hands often.  Avoid close contact (6 feet, which is about two arm lengths) with people who are sick.  Put distance between yourself and other people if COVID-19 is spreading in your community.  Clean and disinfect frequently touched surfaces.  Avoid all cruise travel and non-essential air travel.  Call your healthcare professional if you have concerns about COVID-19 and your underlying condition or if you are sick.     For more information on steps you can take to protect yourself, see CDC's How to Protect Yourself Patient/family/caregiver given information for [employer] and agrees to enroll no. States \"they called too many times\". Patient's preferred e-mail:  NA  Patient's preferred phone number: NA  Based on Loop alert triggers, patient will be contacted by nurse care manager for worsening symptoms. Plan for follow-up call in 5-7 days based on severity of symptoms and risk factors. Patient requesting follow up call in 1 week. States he is \"on way\" to outpatient P.T. at this time.
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
## 2020-10-29 NOTE — ED NOTES
Patient pugnacious with staff, stating \"I didn't come here to lie down. \"   Patient total time from door to discharge 33 minutes. Patient medicated and discharged, ambulatory out of department.

## 2020-10-30 ENCOUNTER — OFFICE VISIT (OUTPATIENT)
Dept: ORTHOPEDIC SURGERY | Age: 55
End: 2020-10-30
Payer: COMMERCIAL

## 2020-10-30 VITALS
TEMPERATURE: 96.8 F | SYSTOLIC BLOOD PRESSURE: 153 MMHG | WEIGHT: 141.2 LBS | OXYGEN SATURATION: 97 % | HEIGHT: 67 IN | HEART RATE: 84 BPM | BODY MASS INDEX: 22.16 KG/M2 | DIASTOLIC BLOOD PRESSURE: 92 MMHG

## 2020-10-30 DIAGNOSIS — Z96.642 STATUS POST TOTAL REPLACEMENT OF LEFT HIP: Primary | ICD-10-CM

## 2020-10-30 DIAGNOSIS — G89.18 ACUTE POST-OPERATIVE PAIN: ICD-10-CM

## 2020-10-30 PROCEDURE — 73502 X-RAY EXAM HIP UNI 2-3 VIEWS: CPT | Performed by: PHYSICIAN ASSISTANT

## 2020-10-30 PROCEDURE — 99024 POSTOP FOLLOW-UP VISIT: CPT | Performed by: PHYSICIAN ASSISTANT

## 2020-10-30 RX ORDER — OXYCODONE HYDROCHLORIDE 5 MG/1
5 TABLET ORAL
Qty: 28 TAB | Refills: 0 | Status: SHIPPED | OUTPATIENT
Start: 2020-10-30 | End: 2020-11-06

## 2020-10-30 NOTE — PROGRESS NOTES
Yessica Rocha returns the office status post his left primary total hip replacement date 6 October 2020. He has had some pain to the left hip. He was forced to go to the emergency room on 28 October 2020 secondary to severe pain. He is try to manage his pain unsuccessfully with Tylenol. He has been out of his opiate analgesic for a couple weeks. He still using an aspirin for thromboembolism prophylaxis. The patient's left hip intact surgical incision nontender wound borders well approximated. Passive internal or external rotation of the hip at 8 and 12 degrees without pain. Hip flexor strength still moderately weak against resistance 3+/5. Femoral nerve and quad function intact fully left lower extremity. X-ray: [facility] AP pelvis and left hip crosstable lateral left total hip replacement stable intact with no evidence of periprosthetic fracturing or dislocation. Alignment is anatomical.    Plan: Patient was refilled of his pain medicines today. He is can follow back in 3 weeks. He will continue full weightbearing of the left lower extremity. Continue PT OT per protocol. X-rays reviewed copies provided all of his questions answered to his satisfaction.

## 2020-11-03 ENCOUNTER — HOSPITAL ENCOUNTER (OUTPATIENT)
Dept: PHYSICAL THERAPY | Age: 55
Discharge: HOME | End: 2020-11-03
Payer: COMMERCIAL

## 2020-11-03 PROCEDURE — 97112 NEUROMUSCULAR REEDUCATION: CPT

## 2020-11-03 PROCEDURE — 97116 GAIT TRAINING THERAPY: CPT

## 2020-11-03 PROCEDURE — 97530 THERAPEUTIC ACTIVITIES: CPT

## 2020-11-03 PROCEDURE — 97110 THERAPEUTIC EXERCISES: CPT

## 2020-11-03 NOTE — PROGRESS NOTES
PHYSICAL THERAPY - DAILY TREATMENT NOTE    Patient Name: Tiffany Magallon        Date: 11/3/2020  : 1965   YES Patient  Verified  Visit #:   4     Insurance: Payor: Nick Real / Plan: [address] / Product Type: Managed Care Medicaid /      In time: 1:53 Out time: 2:52   Total Treatment Time: 59       TREATMENT AREA = Left hip pain [M25.552]    SUBJECTIVE    Pain Level (on 0 to 10 scale):  0  / 10   Medication Changes/New allergies or changes in medical history, any new surgeries or procedures? NO    If yes, update Summary List   Subjective Functional Status/Changes:  []  No changes reported     \"Aside from a little of that tingling, I can't really complain. The past two days have been pretty good, only had to take a pain pill once. \"          OBJECTIVE     Therapeutic Procedures:  Min Procedure Specifics + Rationale   n/a [x]  Patient Education (performed throughout session) [x] Review HEP    [] Progressed/Changed HEP based on:   [] proper performance and advancement of Therex/TA   [] reduction in pain level    [] increased functional capacity       [] change in directional preference   11 [x] Therapeutic Exercise   [x]  See Flowsheet   Rationale: increase ROM and increase strength to improve the patients ability to participate in ADL's    8 [x]  Gait Training       HK/Retro/Sidestepping  Rationale: Normalize gait, increase proprioceptive and kinesthetic awareness, coordination, balance   15 [x] Therapeutic Activity   [x]  See Flowsheet  Rationale:  To improve safety, proprioception, coordination, and efficiency with tasks   15(15) [x] Neuromuscular Re-ed   [x]  See Flowsheet  Rationale: increase ROM, increase strength, improve coordination, improve balance and increase proprioception  to improve the patients ability to safely participate in ADL's     Modality rationale: decrease inflammation, decrease pain, increase tissue extensibility and increase muscle contraction/control to improve the patients ability to perform ADL's with greater ease       Min Type Additional Details   10 [x]  Cold Pack   [] pre-ELIA      [x] post-ELIA  []  Ice massage Location: left hip    [x] supine              [] prone  [x]Right leg elevated    [] legs flat   [] sitting   [x] Skin assessment post-treatment:  [x]intact [x]redness- no adverse reaction       []redness - adverse reaction:     Other Objective/Functional Measures:    Increased reps/sets/resistance per flow sheet. Modified paul test performed as standing hip flexor stretch at step     Post Treatment Pain Level (on 0 to 10) scale:   0 \"sore\"  / 10     ASSESSMENT    Assessment/Changes in Function:       Improved bed mobility. Able to perform sit to supine transfer without right LE assist.          Patient will continue to benefit from skilled PT services to modify and progress therapeutic interventions, address functional mobility deficits, address ROM deficits, address strength deficits, analyze and address soft tissue restrictions, analyze and cue movement patterns, analyze and modify body mechanics/ergonomics and instruct in home and community integration  to attain remaining goals   Progress toward goals / Updated goals:    Improved transfer     PLAN    [x]  Upgrade activities as tolerated  [x]  Update interventions per flow sheet YES Continue plan of care   []  Discharge due to :    []  Other:      Therapist: Merlinda Ard \"BJ\" Nel Borja DPT, Cert. MDT, Cert. DN, Cert. SMT, Dip.  Osteopractic    Date: 11/3/2020 Time: 2:02 PM     Future Appointments   Date Time Provider Kai Rucker   11/5/2020  2:00 PM Valerie Done, PT [facility]   11/10/2020  2:00 PM Adelin Seen [facility]   11/12/2020  2:00 PM Adelin Seen [facility]   11/17/2020  2:00 PM Evetta Done, PT [facility]   11/19/2020  2:00 PM Evetta Done, PT [facility]   11/23/2020  2:00 PM Evetta Done, PT [facility]   11/25/2020  2:00 PM Valerie Carrion, PT [facility] 11/30/2020  2:00 PM Revonda Mins [facility]

## 2020-11-05 ENCOUNTER — HOSPITAL ENCOUNTER (OUTPATIENT)
Dept: PHYSICAL THERAPY | Age: 55
Discharge: HOME | End: 2020-11-05
Payer: COMMERCIAL

## 2020-11-05 PROCEDURE — 97530 THERAPEUTIC ACTIVITIES: CPT

## 2020-11-05 PROCEDURE — 97110 THERAPEUTIC EXERCISES: CPT

## 2020-11-05 PROCEDURE — 97116 GAIT TRAINING THERAPY: CPT

## 2020-11-05 PROCEDURE — 97112 NEUROMUSCULAR REEDUCATION: CPT

## 2020-11-05 NOTE — PROGRESS NOTES
PHYSICAL THERAPY - DAILY TREATMENT NOTE    Patient Name: Golda Dakins        Date: 2020  : 1965   YES Patient  Verified  Visit #:   5     Insurance: Payor: Nick Real / Plan: [address] / Product Type: Managed Care Medicaid /      In time: 2:00 Out time: 2:55   Total Treatment Time: 55       TREATMENT AREA = Left hip pain [M25.552]    SUBJECTIVE    Pain Level (on 0 to 10 scale):  0  / 10   Medication Changes/New allergies or changes in medical history, any new surgeries or procedures? NO    If yes, update Summary List   Subjective Functional Status/Changes:  []  No changes reported     \"I'm doing pretty good. Able to walk around better. Haven't taken any pain medicine all day today. I'm going to have to cx next week, I'm flying to [address] \"          OBJECTIVE     Therapeutic Procedures:  Min Procedure Specifics + Rationale   n/a [x]  Patient Education (performed throughout session) [x] Review HEP    [] Progressed/Changed HEP based on:   [] proper performance and advancement of Therex/TA   [] reduction in pain level    [] increased functional capacity       [] change in directional preference   13 [x] Therapeutic Exercise   [x]  See Flowsheet   Rationale: increase ROM and increase strength to improve the patients ability to participate in ADL's    8 [x]  Gait Training       HK/Retro/Sidestep  Rationale: Normalize gait, increase proprioceptive and kinesthetic awareness, coordination, balance   14(14) [x] Therapeutic Activity   [x]  See Flowsheet  Rationale:  To improve safety, proprioception, coordination, and efficiency with tasks   10(10) [x] Neuromuscular Re-ed   [x]  See Flowsheet  Rationale: increase ROM, increase strength, improve coordination, improve balance and increase proprioception  to improve the patients ability to safely participate in ADL's     Modality rationale: decrease inflammation, decrease pain, increase tissue extensibility and increase muscle contraction/control to improve the patients ability to perform ADL's with greater ease       Min Type Additional Details   10 [x]  Cold Pack   [] pre-ELIA      [x] post-ELIA  []  Ice massage Location:Left hip    [] supine              [] prone  [] legs elevated    [] legs flat   [] sitting   [x] Skin assessment post-treatment:  [x]intact [x]redness- no adverse reaction       []redness - adverse reaction:     Other Objective/Functional Measures:    Increased reps/sets/resistance per flow sheet. Post Treatment Pain Level (on 0 to 10) scale:   0  / 10     ASSESSMENT    Assessment/Changes in Function:       Tolerated treatment well without complaints of progression, indicating improved functional capacity for ADL's. Patient will continue to benefit from skilled PT services to modify and progress therapeutic interventions, address functional mobility deficits, address ROM deficits, address strength deficits, analyze and address soft tissue restrictions, analyze and cue movement patterns, analyze and modify body mechanics/ergonomics and instruct in home and community integration  to attain remaining goals   Progress toward goals / Updated goals:    Excellent progress in standing activity tolerance. PLAN    [x]  Upgrade activities as tolerated  [x]  Update interventions per flow sheet YES Continue plan of care   []  Discharge due to :    []  Other:      Therapist: Rachell Austin \"BJ\" KIARA Quesada, Cert. MDT, Cert. DN, Cert. SMT, Dip.  Osteopractic    Date: 11/5/2020 Time: 2:13 PM     Future Appointments   Date Time Provider Kai Rucker   11/10/2020  2:00 PM Arlys Number [facility] [address]   11/12/2020  2:00 PM Arlys Number [facility] [address]   11/17/2020  2:00 PM Kiersten Reid, PT [facility] [address]   11/19/2020  2:00 PM Kiersten Reid PT [facility] [address]   11/23/2020  2:00 PM Kiersten Reid PT [facility] [address]   11/25/2020  2:00 PM Kiersten Reid PT [facility] [address]   11/30/2020  2:00 PM Arlys Number [facility] [address]

## 2020-11-06 ENCOUNTER — PATIENT OUTREACH (OUTPATIENT)
Dept: CASE MANAGEMENT | Age: 55
End: 2020-11-06

## 2020-11-06 NOTE — PROGRESS NOTES
Patient contacted regarding COVID-19 risk and screening. Discussed COVID-19 related testing which was not done at this time. Test results were not done. Patient informed of results, if available?      Care Transition Nurse/ Ambulatory Care Manager/ LPN Care Coordinator contacted the patient by telephone to perform follow-up assessment. Verified name and  with patient as identifiers. Patient has following risk factors of: no known risk factors. Symptoms reviewed with patient who verbalized the following symptoms: no symptoms. Due to no new or worsening symptoms encounter was not routed to provider for escalation. Education provided regarding infection prevention, and signs and symptoms of COVID-19 and when to seek medical attention with patient who verbalized understanding. Discussed exposure protocols and quarantine from [address] you at higher risk for severe illness  and given an opportunity for questions and concerns. The patient agrees to contact the COVID-19 hotline [phone] or PCP office for questions related to their healthcare. CTN/ACM/LPN provided contact information for future reference. From CDC: Are you at higher risk for severe illness?  Wash your hands often.  Avoid close contact (6 feet, which is about two arm lengths) with people who are sick.  Put distance between yourself and other people if COVID-19 is spreading in your community.  Clean and disinfect frequently touched surfaces.  Avoid all cruise travel and non-essential air travel.  Call your healthcare professional if you have concerns about COVID-19 and your underlying condition or if you are sick. For more information on steps you can take to protect yourself, see CDC's How to [place] for follow-up call in 7-14 days based on severity of symptoms and risk factors.

## 2020-11-10 ENCOUNTER — APPOINTMENT (OUTPATIENT)
Dept: PHYSICAL THERAPY | Age: 55
End: 2020-11-10
Payer: COMMERCIAL

## 2020-11-12 ENCOUNTER — APPOINTMENT (OUTPATIENT)
Dept: PHYSICAL THERAPY | Age: 55
End: 2020-11-12
Payer: COMMERCIAL

## 2020-11-13 ENCOUNTER — PATIENT OUTREACH (OUTPATIENT)
Dept: CASE MANAGEMENT | Age: 55
End: 2020-11-13

## 2020-11-13 NOTE — PROGRESS NOTES
Patient resolved from Transition of Care episode on 11/13/20  Discussed COVID-19 related testing which was not done at this time. Test results were not done. Patient informed of results, if available? NA     Patient/family has been provided the following resources and education related to COVID-19:                         Signs, symptoms and red flags related to COVID-19            [facility] exposure and quarantine guidelines            [employer] exposure contact - [phone]            Contact for their local Department of Health                 Patient currently reports that the following symptoms have improved:  no new symptoms and no worsening symptoms. No further outreach scheduled with this CTN/ACM/LPN/HC/ MA. Episode of Care resolved. Patient has this CTN/ACM/LPN/HC/MA contact information if future needs arise.

## 2020-11-17 ENCOUNTER — APPOINTMENT (OUTPATIENT)
Dept: PHYSICAL THERAPY | Age: 55
End: 2020-11-17
Payer: COMMERCIAL

## 2020-11-19 ENCOUNTER — APPOINTMENT (OUTPATIENT)
Dept: PHYSICAL THERAPY | Age: 55
End: 2020-11-19
Payer: COMMERCIAL

## 2021-07-07 NOTE — PROGRESS NOTES
Discharge Summary  Internal Medicine    Name: Akil Rodriguez  : 1943  MRN: [id number]    Admission Date: 2021  Discharge Date:  2021   Attending: Cooper Nolan MD    Chief Complaint: Altered Mental Status    Discharge Diagnosis:  - AMS 2/2 VRE UTI   - Dementia  - CVA  - CAD  - CAUTI/ Chronic indwelling pool 2/2 Chronic retension  - Hx of Prostate CA s/p Prostatectomy  - Hx of seizures  - HX of DVT not on AC  - HTN  - HLD  - L leg wound    Primary Care Provider  Kody Garza MD  Phone: [phone]  Fax: [phone]  IP Consult Orders (From admission, onward) Comment     Start     Ordered    21  Inpatient consult to Infectious Diseases  ONE TIME     Provider:  Bradly Brady MD    21                Hospital Course     Summary  78 year old male with PMHx of COPD on RA, HTN, CAD status post PCI with 2 HALLEY, multiple CVAs, dementia, history of prostate cancer s/p resection, history of DVT not on Xarelto, seizures on Keppra 500 mg BID, chronic indwelling Pool who presented to the ED with his daughter for altered mental status. He was noted to have UTI and was empirically started on Daptomycin given hx of VRE UTI. Indwelling pool was removed and he underwent a trial of CIC with significant retention and discomfort, hence pool was reinserted . ID recommended continuing Daptomycin till  after final cultures, daughter who is the POA was informed about the options of  for IV antibiotics vs NH.  She agreed to NH since she expressed she wasn't comfortable bringing him home and was accepted to NH. He was medically cleared for discharge on  and was discharged to [facility] but the daughter later decided that she wants to keep him in the hospital and appealed the discharge to medicare which was denied on 2021 He is being discharged in a stable state to [facility] to complete the course of Daptomycin through 2021 after discussing with daughter.  Orthopedic Coordinator Rounding Note    2020  Admit Date: 10/6/2020  Admit Diagnosis: Avascular necrosis of bone of left hip (HCC) [M87.052]  Post-traumatic osteoarthritis of left hip [M16.52]  Osteoarthritis of left hip [M16.12]  Osteoarthritis of left hip [M16.12]  Procedure: Procedure(s):  LEFT TOTAL HIP ARTHROPLASTY/BIOMET/2 SA'S  Post Op day: 1 Day Post-Op    Vital Signs:    Blood pressure (!) 155/68, pulse 81, temperature 97.2 °F (36.2 °C), resp. rate 18, height 5' 7\" (1.702 m), weight 65.3 kg (144 lb), SpO2 100 %. Temp (24hrs), Av °F (36.7 °C), Min:97.2 °F (36.2 °C), Max:98.8 °F (37.1 °C)    PAIN RELIEF: some relief of pain with current prescribed medications. I/O  10/07 0701 - 10/07 1900  In: 360 [P.O.:360]  Out: -   10/05 1901 - 10/07 0700  In: 5583 [P.O.:240; I.V.:850]  Out: 2750 [Urine:1950; Drains:600]    Surgical Wound:  [REMOVED] Wound Hip Left Hemovac to left hip with tegaderm drg D/I (Removed)   Dressing Status Clean, dry, and intact 10/07/20 0813   Dressing Type Sutures; Staples;Transparent film 10/07/20 0813   Incision Site Well Approximated Yes 10/07/20 0813   Assessment Clean, dry, and intact 10/07/20 0813   Number of days: 1         PT/OT:   PATIENT MOBILITY    Bed Mobility  Supine to Sit: Stand-by assistance, Additional time  Sit to Supine: Stand-by assistance  Scooting: Stand-by assistance  Transfers  Sit to Stand: Stand-by assistance  Stand to Sit: Stand-by assistance  Bed to Chair: Stand-by assistance      Gait  Speed/Sonia: Pace decreased (<100 feet/min)  Step Length: Right shortened, Left shortened  Stance: Left decreased  Gait Abnormalities: Decreased step clearance, Antalgic  Ambulation - Level of Assistance: Supervision  Distance (ft): 200 Feet (ft)  Assistive Device: Walker, rolling  Rail Use: Right   Stairs - Level of Assistance: Supervision  Number of Stairs Trained: 8   Weight Bearing Status  Right Side Weight Bearing: Full  Left Side Weight Bearing: As     Objective Data     Imaging    US KIDNEY(S) AND BLADDER URINARY TRACT   Final Result      Suggestion of right ureterovesicular junction calculus without   demonstration of right ureteral jet.  Although there is no definite   hydronephrosis on the right side, the developing obstructive process cannot   be entirely excluded.      Bilateral sub-5 mm renal calculi.      Minimal layering debris within the urinary bladder.  Correlation for   cystitis is recommended.      Increased echogenicity of the kidneys is suspicious for nonspecific medical   renal disease.      Lobulated appearance of the kidneys can be seen with scar.      Electronically Signed by: CHAVEZ CORNEJO M.D.    Signed on: 6/30/2021 5:21 PM          CT HEAD WO CONTRAST   Final Result   No acute intracranial abnormality.      Noncontrast enhanced CT is a screening survey. Conditions such as vascular   malformations, aneurysms, low grade, tumors, meningitis, subtle   subarachnoid hemorrhages, etc., may not be demonstrated. Followup studies   as clinically indicated may be necessary.       Electronically Signed by: BILL MORENO M.D.    Signed on: 6/29/2021 5:30 PM          XR CHEST PA OR AP 1 VIEW   Final Result   FINDINGS AND IMPRESSION:       Heart size is normal.  Calcified right hilar lymph nodes.  The lungs are   hyperinflated.  No infiltrate, pleural effusion or pneumothorax.  Lobulated   right hemidiaphragm posterior cord changes lower cervical spine.         Electronically Signed by: BILL MORENO M.D.    Signed on: 6/29/2021 3:01 PM            Labs  No results found for this or any previous visit (from the past 24 hour(s)).    Discharge Assessment and Plan     Pre-Admission Home Medications  Current Outpatient Medications   Medication Instructions   • acetaminophen (TYLENOL) 650 mg, Oral, EVERY 6 HOURS PRN   • albuterol-ipratropium (COMBIVENT RESPIMAT) 100-20 MCG/ACT inhaler 1 puff, Inhalation, EVERY 4 HOURS PRN   • allopurinol (ZYLOPRIM) 300 mg,  tolerated        Discharge To: HOME     Discharge medications reviewed with the patient and appropriate educational materials and side effects teaching were provided. Reviewed activity orders and incentive spirometry. Opportunity for questions and clarification provided.     NATE VillalobosN, RN, [address] [place] [address]  Orthopedic  Oral, DAILY   • aspirin 81 mg, Oral, DAILY   • atorvastatin (LIPITOR) 40 mg, Oral, DAILY   • budesonide-formoterol (SYMBICORT) 80-4.5 MCG/ACT inhaler 2 puffs, Inhalation, 2 TIMES DAILY   • donepezil (ARICEPT) 10 mg, Oral, NIGHTLY   • DULoxetine (CYMBALTA) 30 mg, Oral, DAILY   • enoxaparin (LOVENOX) 40 mg, Subcutaneous, DAILY   • fluticasone (FLONASE) 50 MCG/ACT nasal spray 2 sprays, Nasal, DAILY   • levETIRAcetam (KEPPRA) 500 mg, Oral, 2 TIMES DAILY   • memantine (NAMENDA) 5 mg, Oral, 2 TIMES DAILY   • metoPROLOL succinate (TOPROL-XL) 25 mg, Oral, DAILY   • mirtazapine (REMERON) 15 mg, Oral, NIGHTLY   • pantoprazole (PROTONIX) 40 mg, Oral, DAILY   • polyethylene glycol (MIRALAX) 17 g, Oral, DAILY, For constipation: Stir and dissolve powder in any 4 to 8 ounces of beverage, then drink.   • sodium chloride 0.9 % Solution 100 mL with DAPTOmycin 500 MG Recon Soln 380 mg 6 mg/kg, Intravenous, EVERY 24 HOURS   • TAMSULOSIN HCL PO 0.8 mg, Oral, DAILY        Summary of your Discharge Medications      Take these Medications      Details   acetaminophen 325 MG tablet  Commonly known as: TYLENOL  Notes to patient: Pain   Take 650 mg by mouth every 6 hours as needed for Pain.     albuterol-ipratropium 100-20 MCG/ACT inhaler  Commonly known as: COMBIVENT RESPIMAT  Notes to patient: SOB/Wheezing   Inhale 1 puff into the lungs every 4 hours as needed for Shortness of Breath.     allopurinol 300 MG tablet  Commonly known as: ZYLOPRIM  Notes to patient: Gout   Take 1 tablet by mouth daily.  Comment: Collaborating MD- Dr Kaila Villegas     aspirin 81 MG chewable tablet  Notes to patient: Blood thinner   Chew 81 mg by mouth daily.     atorvastatin 40 MG tablet  Commonly known as: LIPITOR  Notes to patient: Cholesterol   Take 40 mg by mouth daily.     budesonide-formoterol 80-4.5 MCG/ACT inhaler  Commonly known as: SYMBICORT  Notes to patient: SOB/Wheezing   Inhale 2 puffs into the lungs 2 times daily.     donepezil 10 MG  tablet  Commonly known as: ARICEPT  Notes to patient: Dementia   Take 1 tablet by mouth nightly.  Comment: Collaborating MD- Dr Christophe Villegas     DULoxetine 30 MG capsule  Commonly known as: CYMBALTA  Notes to patient: Depression   Take 1 capsule by mouth daily.  Comment: Collaborating MD- Dr Kaila Villegas     enoxaparin 40 MG/0.4ML injectable solution  Commonly known as: LOVENOX  Notes to patient: Blood thinner   Inject 0.4 mLs into the skin daily. Do not start before July 3, 2021.     fluticasone 50 MCG/ACT nasal spray  Commonly known as: FLONASE  Notes to patient: Runny nose   Spray 2 sprays in each nostril daily.     levETIRAcetam 500 MG tablet  Commonly known as: KepPRA  Notes to patient: Seizures   Take 500 mg by mouth 2 times daily.     memantine 5 MG tablet  Commonly known as: NAMENDA  Notes to patient: Memory   Take 5 mg by mouth 2 times daily.     metoPROLOL succinate 25 MG 24 hr tablet  Commonly known as: TOPROL-XL  Notes to patient: Blood pressure   Take 1 tablet by mouth daily.     mirtazapine 15 MG tablet  Commonly known as: REMERON   Take 15 mg by mouth nightly.     pantoprazole 40 MG tablet  Commonly known as: PROTONIX  Notes to patient: PPI- antacid   Take 40 mg by mouth daily.     polyethylene glycol 17 GM/SCOOP powder  Commonly known as: MIRALAX  Notes to patient: Laxative   Take 17 g by mouth daily. For constipation: Stir and dissolve powder in any 4 to 8 ounces of beverage, then drink.     sodium chloride 0.9 % Solution 100 mL with DAPTOmycin 500 MG Recon Soln 380 mg  Notes to patient: Antibiotic- Hang at 0900 7/8 (last dose)    Inject 380 mg into the vein every 24 hours.     TAMSULOSIN HCL PO  Notes to patient: Urination   Take 0.8 mg by mouth daily.            Discharge Instructions    Follow up with PCP

## (undated) DEVICE — NEEDLE HYPO 18GA L1.5IN PNK S STL HUB POLYPR SHLD REG BVL

## (undated) DEVICE — SPIROMETER INCENT 2500ML W ONE W VLV

## (undated) DEVICE — LIMB HOLDER, WRIST/ANKLE: Brand: DEROYAL

## (undated) DEVICE — GOWN,REINFORCED,POLY,AURORA,XLARGE,STRL: Brand: MEDLINE

## (undated) DEVICE — 4-PORT MANIFOLD: Brand: NEPTUNE 2

## (undated) DEVICE — PACK PROCEDURE SURG TOT HIP BSHR LF

## (undated) DEVICE — PREP SKN CHLRAPRP APL 26ML STR --

## (undated) DEVICE — TAPE,CLOTH/SILK,CURAD,3"X10YD,LF,40/CS: Brand: CURAD

## (undated) DEVICE — SYR LR LCK 1ML GRAD NSAF 30ML --

## (undated) DEVICE — INTENDED FOR TISSUE SEPARATION, AND OTHER PROCEDURES THAT REQUIRE A SHARP SURGICAL BLADE TO PUNCTURE OR CUT.: Brand: BARD-PARKER SAFETY BLADES SIZE 15, STERILE

## (undated) DEVICE — T4 HOOD

## (undated) DEVICE — HANDPIECE SET WITH HIGH FLOW TIP AND SUCTION TUBE: Brand: INTERPULSE

## (undated) DEVICE — 3M™ IOBAN™ 2 ANTIMICROBIAL INCISE DRAPE 6651EZ: Brand: IOBAN™ 2

## (undated) DEVICE — KIT CLN UP BON SECOURS MARYV

## (undated) DEVICE — SUTURE VCRL SZ 2-0 L27IN ABSRB VLT L36MM CT-1 1/2 CIR J339H

## (undated) DEVICE — WATERPROOF, BACTERIA PROOF DRESSING WITH ABSORBENT SEE THROUGH PAD: Brand: OPSITE POST-OP VISIBLE 25X10CM CTN 20

## (undated) DEVICE — SUTURE VCRL SZ 2 L27IN ABSRB VLT L65MM TP-1 1/2 CIR J649G

## (undated) DEVICE — KIT EVAC 400CC DIA1/4IN H PAT 12.5IN 3 SPR RND SHP PVC DRN

## (undated) DEVICE — Device

## (undated) DEVICE — DRESSING FOAM DISK DIA1IN H 7MM HYDRPHLC CHG IMPREG IN SL

## (undated) DEVICE — SOFT SILICONE HYDROCELLULAR SACRUM DRESSING WITH LOCK AWAY LAYER: Brand: ALLEVYN LIFE SACRUM (LARGE) PACK OF 10

## (undated) DEVICE — SOLUTION IRRIG 3000ML 0.9% SOD CHL FLX CONT 0797208] ICU MEDICAL INC]

## (undated) DEVICE — REM POLYHESIVE ADULT PATIENT RETURN ELECTRODE: Brand: VALLEYLAB

## (undated) DEVICE — BANDAGE COBAN 6 IN WND 6INX5YD FOAM

## (undated) DEVICE — SYRINGE IRRIG 60ML SFT PLIABLE BLB EZ TO GRP 1 HND USE W/

## (undated) DEVICE — BLANKET WRM AD W50XL85.8IN PACU FULL BODY FORC AIR

## (undated) DEVICE — 3M™ TEGADERM™ TRANSPARENT FILM DRESSING FRAME STYLE, 1626W, 4 IN X 4-3/4 IN (10 CM X 12 CM), 50/CT 4CT/CASE: Brand: 3M™ TEGADERM™

## (undated) DEVICE — NEEDLE SPNL 22GA L3.5IN BLK HUB S STL REG WALL FIT STYL W/

## (undated) DEVICE — AEGIS 1" DISK 4MM HOLE, PEEL OPEN: Brand: MEDLINE

## (undated) DEVICE — GARMENT,MEDLINE,DVT,SEQUENTIAL,CALF,MD: Brand: MEDLINE

## (undated) DEVICE — Z DISCONTINUED BY MEDLINE USE 2711682 TRAY SKIN PREP DRY W/ PREM GLV

## (undated) DEVICE — 2108 SERIES SAGITTAL BLADE (24.8 X 1.24 X 80.1MM)

## (undated) DEVICE — INTENDED FOR TISSUE SEPARATION, AND OTHER PROCEDURES THAT REQUIRE A SHARP SURGICAL BLADE TO PUNCTURE OR CUT.: Brand: BARD-PARKER SAFETY BLADES SIZE 10, STERILE

## (undated) DEVICE — SOLUTION IV 1000ML 0.9% SOD CHL

## (undated) DEVICE — SUTURE VCRL SZ 2-0 L36IN ABSRB UD L36MM CT-1 1/2 CIR J945H

## (undated) DEVICE — SOLUTION SCRB 16OZ SKIN 4% CHG CLN BASE W/ PMP FOR PT SKIN

## (undated) DEVICE — TRAY,URINE METER,100% SILICONE,16FR10ML: Brand: MEDLINE